# Patient Record
Sex: FEMALE | Race: WHITE | NOT HISPANIC OR LATINO | Employment: FULL TIME | URBAN - METROPOLITAN AREA
[De-identification: names, ages, dates, MRNs, and addresses within clinical notes are randomized per-mention and may not be internally consistent; named-entity substitution may affect disease eponyms.]

---

## 2020-10-07 ENCOUNTER — TRANSCRIBE ORDERS (OUTPATIENT)
Dept: SCHEDULING | Facility: REHABILITATION | Age: 60
End: 2020-10-07

## 2020-10-07 DIAGNOSIS — R42 DIZZINESS AND GIDDINESS: ICD-10-CM

## 2020-10-07 DIAGNOSIS — M54.2 CERVICALGIA: ICD-10-CM

## 2020-10-07 DIAGNOSIS — S06.0X0S CONCUSSION WITHOUT LOSS OF CONSCIOUSNESS, SEQUELA (CMS/HCC): Primary | ICD-10-CM

## 2020-10-07 DIAGNOSIS — R51.9 HEADACHE: ICD-10-CM

## 2020-10-14 ENCOUNTER — HOSPITAL ENCOUNTER (OUTPATIENT)
Dept: PHYSICAL THERAPY | Facility: REHABILITATION | Age: 60
Setting detail: THERAPIES SERIES
Discharge: HOME | End: 2020-10-14
Attending: PHYSICAL MEDICINE & REHABILITATION
Payer: MEDICARE

## 2020-10-14 DIAGNOSIS — M54.2 CERVICALGIA: ICD-10-CM

## 2020-10-14 DIAGNOSIS — R42 DIZZINESS AND GIDDINESS: ICD-10-CM

## 2020-10-14 DIAGNOSIS — R51.9 HEADACHE: ICD-10-CM

## 2020-10-14 DIAGNOSIS — S06.0X0S CONCUSSION WITHOUT LOSS OF CONSCIOUSNESS, SEQUELA (CMS/HCC): ICD-10-CM

## 2020-10-14 PROCEDURE — 97163 PT EVAL HIGH COMPLEX 45 MIN: CPT

## 2020-10-14 PROCEDURE — 97530 THERAPEUTIC ACTIVITIES: CPT | Mod: GP

## 2020-10-14 RX ORDER — AMITRIPTYLINE HYDROCHLORIDE 25 MG/1
25 TABLET, FILM COATED ORAL NIGHTLY
COMMUNITY

## 2020-10-14 RX ORDER — CYCLOBENZAPRINE HCL 5 MG
5 TABLET ORAL AS NEEDED
COMMUNITY

## 2020-10-14 RX ORDER — LEVOTHYROXINE SODIUM 75 UG/1
75 TABLET ORAL
COMMUNITY

## 2020-10-14 RX ORDER — ROSUVASTATIN CALCIUM 5 MG/1
2.5 TABLET, COATED ORAL NIGHTLY
COMMUNITY

## 2020-10-19 ENCOUNTER — HOSPITAL ENCOUNTER (OUTPATIENT)
Dept: PHYSICAL THERAPY | Facility: REHABILITATION | Age: 60
Setting detail: THERAPIES SERIES
Discharge: HOME | End: 2020-10-19
Attending: PHYSICAL MEDICINE & REHABILITATION
Payer: MEDICARE

## 2020-10-19 ENCOUNTER — DOCUMENTATION (OUTPATIENT)
Dept: SOCIAL WORK | Facility: REHABILITATION | Age: 60
End: 2020-10-19

## 2020-10-19 ENCOUNTER — HOSPITAL ENCOUNTER (OUTPATIENT)
Dept: OCCUPATIONAL THERAPY | Facility: REHABILITATION | Age: 60
Setting detail: THERAPIES SERIES
Discharge: HOME | End: 2020-10-19
Attending: PHYSICAL MEDICINE & REHABILITATION
Payer: MEDICARE

## 2020-10-19 DIAGNOSIS — M54.2 CERVICALGIA: ICD-10-CM

## 2020-10-19 DIAGNOSIS — S06.0X0S CONCUSSION WITHOUT LOSS OF CONSCIOUSNESS, SEQUELA (CMS/HCC): Primary | ICD-10-CM

## 2020-10-19 DIAGNOSIS — S06.0X0S CONCUSSION WITHOUT LOSS OF CONSCIOUSNESS, SEQUELA (CMS/HCC): ICD-10-CM

## 2020-10-19 DIAGNOSIS — G89.29 CHRONIC NONINTRACTABLE HEADACHE, UNSPECIFIED HEADACHE TYPE: ICD-10-CM

## 2020-10-19 DIAGNOSIS — R51.9 CHRONIC NONINTRACTABLE HEADACHE, UNSPECIFIED HEADACHE TYPE: ICD-10-CM

## 2020-10-19 DIAGNOSIS — R42 DIZZINESS AND GIDDINESS: ICD-10-CM

## 2020-10-19 PROCEDURE — 97112 NEUROMUSCULAR REEDUCATION: CPT | Mod: GP

## 2020-10-19 PROCEDURE — 97530 THERAPEUTIC ACTIVITIES: CPT | Mod: GP

## 2020-10-19 PROCEDURE — 97166 OT EVAL MOD COMPLEX 45 MIN: CPT | Mod: GO

## 2020-10-19 PROCEDURE — 97530 THERAPEUTIC ACTIVITIES: CPT | Mod: GO

## 2020-10-19 NOTE — PROGRESS NOTES
Referring Provider: By co-signing this Plan of Care (POC), you agree with the planned services and interventions recommended by the therapist.       NAME: _________________________________ DATE: _________________        [facility] Fax: [phone]      OT EVALUATION FOR OUTPATIENT THERAPY    Patient: Dulce Seo   MRN: [id number]  : 1960 60 y.o.  Referring Physician: Kevin Saleh DO  Date of Visit: 10/19/2020    Certification Dates:   10/19/20 through 20    Recommended Frequency & Duration:  Other(1 to 2 times per week) for up to 6 weeks     Diagnosis:   1. Concussion without loss of consciousness, sequela (CMS/HCC)          Chief Complaints:   Chief Complaint   Patient presents with   • Decreased Endurance   • Pain   •  Decreased Community Integration   •  Difficulty Performing Work/school Tasks   • Decreased recreational/play activity   • Poor Symptom Management       Precautions: no known precautions/restrictions    Past Medical History:   Past Medical History:   Diagnosis Date   • Ankle sprain    • Arthritis    • Dizziness    • Hyperthyroidism    • Low back strain    • Neck strain    • Shoulder injury        Past Surgical History:   Past Surgical History:   Procedure Laterality Date   • COSMETIC SURGERY     • JOINT REPLACEMENT           LEARNING ASSESSMENT    Assessment completed: Yes    Learner name:  Dulce Seo    Relationship: Patient    Learning Barriers:  Learning barriers: No Barriers    Preferred Language: English     Needed: No    Learning New Concepts: Listening, Reading, Demonstration and Pictures/Video      CO-LEARNER ASSESSMENT:    Completed: No            OBJECTIVE MEASUREMENTS/DATA:    Eval Assessment    Evaluation Assessment and Plan - 10/19/20 2618        Evaluation Assessment and Plan    Plan of Care reviewed and patient/family in agreement  Yes     System Pathology/Pathophysiology Noted  neuromuscular     Functional Limitations in Following  Categories  home management;work;community/leisure     Rehab Potential/Prognosis: Occupational Therapy  good, to achieve stated therapy goals     Clinical Assessment  61 yo female is presenting for an initial Occupational Therapy evaluation s/p concussion. Self-reported PPCS are 40/64 as measured by the [facility] [facility] Concussion Questionaire. She is reporting limited participation with IADLs/driving/recreation activities. She is not yet able to return to work. Reading and computer tolerance are poor. Dulce will benefit from further skilled OT to address functional deficits to help her reach prior level of function. She is challenged with ocular ROM deficits, convergence insufficiency, difficulty with tracking, intolerance to visual clutter, and limited visual endurance/concentration which may contribute to PPCS and impact participation in IADLs/community activities.     Planned Services  CPT 24290 Neuromuscular Reeducation;CPT 02368 Self-care/Home management training;CPT 88301 Therapeutic activities;CPT 26500 Therapeutic exercises         General Information    General Information - 10/19/20 1640        General Information    Document Type  initial evaluation     Onset of Illness/Injury or Date of Surgery  09/01/20     Referring Physician  Dr. Kevin Saleh     Pertinent History of Current Functional Problem  The patient sustained a concussion on 9/1/2020 as a result of a fall while at work. Due to persistent symptoms, she was referred to the Con[facility]ussion [facility] at [facility] where she saw Dr. Kevin Saleh. She is now presenting for Occupational Therapy.     Limitations/Impairments  visual     Existing Precautions/Restrictions  no known precautions/restrictions        Time Calculation    Start Time  1150     Stop Time  1249     Time Calculation (min)  59 min         Pain and Vitals   Pain/Vitals - 10/19/20 1158        Pain Assessment    Currently in pain  Yes     Preferred Pain Scale  number (Numeric Rating Pain  Scale)     Pain: Body location  Head;Eye     Pain Rating (0-10): Pre Activity  6     Pain Rating (0-10): Activity  7     Pain Rating (0-10): Post Activity  7        Pain Interventions    Intervention   Rest breaks as needed     Post Intervention Comments  None         Type and Frequency:   OT - 10/19/20 1641        Occupational Therapy Encounter Type Details    Occupational Therapy  Neuro        OT Frequency and Duration    Frequency of treatment  Other    1 to 2 times per week    OT Duration  6 weeks     OT Cert From  10/19/20     OT Cert To  12/08/20     Date OT POC was sent to provider  10/19/20     Signed OT Plan of Care received?   No         PLOF:   Prior Level of Function - 10/19/20 1203        OTHER    Previous level of function  Independent IADLs/Community Activities   +         Living Environment   Living Environment - 10/19/20 1202        Living Environment    Lives With  sibling(s)     Living Arrangements  house     Living Environment Comment  1 STH with 2 JESSICA     Transportation Concerns  car, none         Falls Assessment   Falls Assessment - 10/19/20 1200        Initial Falls Assessment    One or more falls in the last year  Yes     How many times  1     Was the patient injured in any fall  Yes     Fall prevention interventions recommended  Keep personal items within reach;Educate and re-educate the patient on safety strategies;Provide education to patient and family to maintain clear pathways and doorways throughout home;Put nightlight or bathroom light on during evening/night;Apply non-skid footwear at bedtime;Instruct the patient to change position slowly         Reading and Writing    Reading and Writing - 10/19/20 1206        Reading and Writing Assessment    Reading (comments)  Reading tolerance: has not been reading; Computer tolerance: approximately 5 minutes iphone         Work and School    Work and School Assessment - 10/19/20 1204        Work/School/Leisure Assessment    Job  Performance (comments)  Works full time as a  at [employer] - currently on medical leave     Leisure / Social Participation (comments)  Likes bird watching         Vision    Vision - 10/19/20 1209        Vision Assessment    Visual Impairment/Limitations  blurry vision;corrective lenses full time    Intermittent blurry vision with fatigue or when trying to focus    Visual Motor Impairment  accommodation;convergence, left;convergence, right;visual tracking, left;visual tracking, down;visual tracking, right;visual tracking, up    ocular ROM/gaze fixation; smooth pursuits       Oculomotor Control    Left eye assessed?  Yes     Right eye assessed?  Yes     Superior assessed?  Yes     Inferior assessed?  Yes     Diagonal assessed?  Yes     Circular assessed?  Yes     Left AROM without target  ROM Intact     Left oculomotor AROM Discomfort  Moderate     Left gaze fixation (10 second hold)  Able      Right AROM without target  ROM Intact      Right oculomotor AROM Discomfort  Moderate     Right gaze fixation (10 second hold)  Able     Superior AROM without target  ROM Intact      Superior oculomotor AROM Discomfort  Moderate     Superior gaze fixation (10 second hold)  Able     Inferior AROM without target  ROM Intact     Inferior oculomotor AROM Discomfort  Moderate     Inferior gaze fixation (10 second hold)  Able     Diagonal AROM without target  ROM Intact     Diagonal oculomotor AROM Discomfort  Mild     Circular AROM without target  ROM Intact     Circular oculomotor AROM Discomfort  Mild        Eye Teaming    Convergence  Impaired    Blurry at approximately 50 inches    Divergence  Impaired     Accommodation  Impaired     Smooth Pursuits  Impaired     3D Tracking  Impaired     Nystagmus  No        Saccadic Fixation Speed    Vertical Saccadic Fixation  --    NT due to escalating headache and time constraints    Horizontal Saccadic Fixation  --    NT due to escalating headache and time  constraints    Jesse Devick Test #1 (seconds)  --    NT due to escalating headache and time constraints       Visual Reaction Timing    Dynavision  NT due to escalating headache and time constraints        Symptoms and Outcome Measures    Symptoms  Headache;Dizziness;Nausea;Phonophobia;Sleep disturbance;Irritability;Fatigue;Aural fullness;Car sickness;Cognitive changes;Cognitive Fatigue;Difficulty reading;Difficulty using computer;Photophobia;Difficulty watching TV;Fogginess;Imbalance;Lightheaded;Multi-stimulus intolerance;Slowed processing;Tinnitus;Visual changes     [place] Score  40     Symptoms/Comments  See media section for details             GOALS:    Goals        General    • OT Goals      Short Term Goals  Time Frame Result Comment   Full ocular motor range of motion and control with mild PCS symptoms 4 weeks       Convergence less than or equal to 7 inches for near-focus tasks of reading and computer use with mild symptoms 4 weeks       Fair tolerance for normal volume and intensity of visual stimulation with mild symptoms after 20 minutes of engagement in a moderate to high multistim environment. 4 weeks       Visual reaction time within or less than .75 seconds via Dynavision with minimal symptoms  4 weeks       Saccadic fixation speed of less than 57 seconds as measured by the Jesse Devick Test with mild symptoms. 4 weeks       Patient will perform IADLs and community activities with decreased symptoms as evidenced by a 5 to 10 point reduction on the [facility] Concussion Questionaire. 4 weeks       Patient will be independent with visual home exercise program. 4 weeks       Long Term Goals  Time Frame Result Comment   Patient will complete necessary reading for work/leisure/school, with accommodations if indicated, with absent or manageable symptoms. By discharge       Patient will utilize computer for work/leisure/school tasks with accommodations if indicated with absent or manageable symptoms.  By discharge       Patient will perform IADLs and community activities with absent or manageable symptoms as measured by a score of less than 20 on the [facility] Concussion Questionaire. By discharge                      TREATMENT PLAN:      VISION/CONCUSSION OT FLOW SHEET    OT Vision/mTBI  EXERCISES CURRENT SESSION TIME   NEURO RE-ED TOTAL TIME FOR SESSION Not performed   Dynavision    VTS3/VTS4    CPT    BITs    NVR    Saccadic Fixation    Ocular Motor Control    Visual Tracing    3D Tracking    Visual Perception    Convergence/Divergence    Accommodation    Visual Stimulation    THER ACT TOTAL TIME FOR SESSION 8-22 Minutes   Pain, Vitals, Meds, Etc.    HEP Issued and reviewed ocular ROM exercises and modified pencil push ups.   Visual Endurance     Work/School Simulation     SELF CARE TOTAL TIME FOR SESSION Not performed   PCS Symptom Management/Education    ADL/IADLs                                                                                      This 60 y.o. year old female presents to OT with above stated diagnosis. Occupational Therapy evaluation reveals   resulting in home management, work, community/leisure limitations. Dulce Seo will benefit from skilled OT services to address limitation, work towards rehab and patient goals and maximize PLOF of chosen ADLs.     Planned Services: The patient’s treatment will include CPT 90121 Neuromuscular Reeducation, CPT 81999 Self-care/Home management training, CPT 42309 Therapeutic activities, CPT 83484 Therapeutic exercises, .

## 2020-10-19 NOTE — OP OT TREATMENT LOG
VISION/CONCUSSION OT FLOW SHEET    OT Vision/mTBI  EXERCISES CURRENT SESSION TIME   NEURO RE-ED TOTAL TIME FOR SESSION Not performed   Dynavision    VTS3/VTS4    CPT    BITs    NVR    Saccadic Fixation    Ocular Motor Control    Visual Tracing    3D Tracking    Visual Perception    Convergence/Divergence    Accommodation    Visual Stimulation    THER ACT TOTAL TIME FOR SESSION 8-22 Minutes   Pain, Vitals, Meds, Etc.    HEP Issued and reviewed ocular ROM exercises and modified pencil push ups.   Visual Endurance     Work/School Simulation     SELF CARE TOTAL TIME FOR SESSION Not performed   PCS Symptom Management/Education    ADL/IADLs

## 2020-10-19 NOTE — PATIENT INSTRUCTIONS
Patient Education   General Safety Tip Card        1.For safety, all exercises must be performed close to a support (wall, countertop, person, etc.) or in a corner with back of chair in front.  2.Only perform those exercises as instructed by the therapist. If instructions are not clearly understood, wait for clarification by therapist before attempting to perform.    Copyright © [employer]. All rights reserved.        Patient Education   Feet Together, Varied Arm Positions - Eyes Closed        Stand with feet together and arms down or crossws. Close eyes and visualize/sense upright position. Hold _30_ seconds.  Repeat __3-4__ times per session. Do _3-4___ sessions per day.    Copyright © I. All rights reserved.        Patient Education   Feet Together (Compliant Surface) Varied Arm Positions - Eyes Closed        Stand on compliant surface: _FOAM BALANCE PAD_______ with feet together and arms CROSSED OR DOWN. Close eyes and visualize/SENSE upright position. Hold_30___ seconds.  Repeat 3-4____ times per session. Do ___2_ sessions per day.    Copyright © [employer]. All rights reserved.

## 2020-10-19 NOTE — PROGRESS NOTES
CM received update from Katleynn Garcia, Patient Access Representative, that patient has surgery scheduled for 10/30/2020. CM met with patient to introduce self and provided contact information. CM reviewed need to discharge once surgical procedure takes place. Patient aware new prescription will be needed if outpatient services are still needed, once she has been cleared by surgeon.Юлия Saravia CM

## 2020-10-20 PROBLEM — R42 DIZZINESS AND GIDDINESS: Status: ACTIVE | Noted: 2020-10-20

## 2020-10-20 PROBLEM — R51.9 HEADACHE: Status: ACTIVE | Noted: 2020-10-20

## 2020-10-20 PROBLEM — M54.2 CERVICALGIA: Status: ACTIVE | Noted: 2020-10-20

## 2020-10-20 PROBLEM — S06.0X0A CONCUSSION WITH NO LOSS OF CONSCIOUSNESS: Status: ACTIVE | Noted: 2020-10-20

## 2020-10-20 NOTE — OP PT TREATMENT LOG
VESTIBULAR PT TREATMENT LOG                                                                                                                                                                                                                                                                                                                                 PRECAUTIONS:   Fall                                                                               DONE TODAY   y=yes  n=no  nv=next visit  PHYSICAL THERAPY / VESTIBULAR TREATMENT CATEGORY CURRENT SESSION Billable Minutes    Objective Measures Treatment details        Canalith Repositioning Maneuver/Treatment                                  (26524)                           Untimed    CRT Completed on:   See vestibular specialty flowsheet for details      Neuro Re-ed  (14270)                                 TOTAL TIME FOR SESSION:                    45          Minutes    VIDEO FRENZEL/POSITIONAL TESTING Completed on:See vestibular specialty flowsheet for details                                            CORRECTIVE SACCADES/ EYE EXERCISES     Corrective Saccades     VOR CANCELLATION       Standing H/V/C VOR-C      Standing H/V/C VOR-C compliant surface      Ambulation w/ H/V/C VOR-C            VOR / GAZE STABILITY       Standing H/VVOR      Standing H/VVOR, Busy Background     Standing H/VVOR noodles  Foreground  Backgroud  Peripheral      Ambulation w/H/VVOR       H/VVOR on compliant surfaces       H/VVOR on sway surfaces       Functional VOR       DVA                                           Norm: <2line decline Completed on:       GST    Completed on:             HABITUATION       Ball circles      Spiral Walk     Wall Rolls     Infinity Walk      Repetitive functional movements       MSQ                                            Norm=<2% Completed  on:           BALANCE- STATIC       On floor       Airex foam- EO/EC, head turns/nods      Rockerboard     yes STATIC BALANCE Measures    See below for MDCs Completed on: 10/19/2020. Issued home program    Rhomberg EO               60    Rhomberg EO FOAM    60 *    Rhomberg EC               60*    Rhomberg EC FOAM 60*    S.R. EO                        7  Strains back    S.R. EC                         3    SLS R EO 14 hurts back down left leg    SLS L  EO 30    SLS R EC 3    SLS L EC 5    yes SOT                                              MDC=8 pts,   <38 fall risk Completed on:  10/19/2020 47%, 33% below norm with mod decrease in VIS, Severe decrease in VEST. 2 falls on 5 2 falls on 6 below norm fro 3 and 4.      Bi[employer]x                                         HSSOT                                      Completed on:           BALANCE- DYNAMIC      Tandem Walking      Ambulation -head turns/nods       Ambulation - 180 & 360 degree turns      Ambulation EC      Retro ambulation EO/EC       Obstacles      FGA                                            MDC=6pts  Norms: <40=29, 50-59=28, 60-69=27, 70-80=25, 80-90=21   </=23=fall risk  Gait speed MDC =.82 ft/sec Completed on:           High Level Dynamic Functional      Multitasking/Cognitive       Optokinetic Stimulation      CERVICAL KINESTHESIA     JPE           DONE TODAY(y/n) Manual Therapy  (96205)                               TOTAL TIME FOR SESSION:                                    Minutes           DONE TODAY(y/n)  Therapeutic Exercise  (16734)                                 TOTAL TIME FOR SESSION:                                                  Minutes                                                    Lower Extremity Stretching     Cervical Exercises      CARDIOVASCULAR        Nustep            Seat:            Arms: Min      Level     Avg Alonso      Spm      Pieter       Steps    RPE       Recumbent Bike   Min     Miles    Pieter    Virtual Reality Bike   Min   "   Miles    Pieter   HR Avg/Max    Alonso Avg/Max     MPH Avg Max    Treadmill  Min     Mph     % grade        Initial  HR       Max HR           BCTT          DONE TODAY(y/n)  Therapeutic Activity  (24424)                                TOTAL TIME FOR SESSION:                    15             Minutes   yes PAIN/VITALS  See Pain/Vitals section for details   yes OUTCOME MEASURE                 ABC MDC=13pts  DHI MDC=18pts  <67-80=risk for fall Completed on: handed in a second completed [id number]/14/2020:41% on ABC and 84 on DHI  ABC 55% with inconsistencies noticed in answers, DHI 65% with 5 questions not answered.    yes Reviewed chief complaint, medication changes, falls, plan of care, schedule, pain and symptoms     yes PATIENT EDUCATION LOG 10/14/2020:  Pt educated that  treatment will include Vestibular and Balance Rehabilitation including habituation, VOR adaptation, balance training, gait training, symptom management training including symptom monitoring and activity modification to allow incremental exposure to symptom provoking activities, while avoiding overstimulation and promoting adequate rest, healthy diet and participation in light cardiovascular activity.  Pt may also receive cervical evaluation, joint and soft tissue mobilization, manual therapy, neuromuscular re-education, physical reconditioning, exertional training and therapeutic activities to promote return to prior level of function.  Issued patient \" More than a bump on the head\" pamphlet from the brain injury association. Issued Blue brain injury folder with lifestyle recommendations.                    "

## 2020-10-20 NOTE — PROGRESS NOTES
Chantal Leblanc is a 81 year old female presenting for routine follow up and medication review.     Denies known Latex allergy or symptoms of Latex sensitivity.    Medications verified, no changes.    Health Maintenance Due   Topic Date Due   • DTaP/Tdap/Td Vaccine (1 - Tdap) 05/14/1957   • Medicare Wellness 65+  06/18/2019   • Depression Screening  06/18/2019   • Influenza Vaccine (1) 09/01/2019   • DM/CKD Microalbumin  12/10/2019   • DM/CKD GFR  12/10/2019       Patient is due for topics listed above, she wishes to proceed with Immunization(s) Dtap/Tdap/Td and Influenza.        PT DAILY NOTE FOR OUTPATIENT THERAPY    Patient: Dulce Seo   MRN: [id number]  : 1960 60 y.o.  Referring Physician: Kevin Saleh DO  Date of Visit: 10/19/2020    Certification Dates: 10/14/20 through 21    Diagnosis:   1. Concussion without loss of consciousness, sequela (CMS/HCC)    2. Dizziness and giddiness    3. Chronic nonintractable headache, unspecified headache type    4. Cervicalgia        Chief Complaints:  Headache, tightness in the neck and up to the eyes. Didn't sleep well, Fogginess. Balance not too bad , haven't noticed it yet.    Precautions:         TODAY'S VISIT    History/Vitals/Pain/Encounter Info - 10/19/20 1011        Injury History/Precautions/Daily Required Info    Primary Therapist  Will Rocael PT     Chief Complaint/Reason for Visit   Headache, tightness in the neck and up to the eyes. Didn't sleep well, Fogginess. Balance not too bad , haven't noticed it yet.     Onset of Illness/Injury or Date of Surgery  20     Referring Physician  Dr. Saleh     Pertinent History of Current Functional Problem  Pt reports there was a hump on the floor in a restaurant and tripped on the hump and fell on the floor.  On my way down I remember a pain in my back and then I blacked out.  I couldn't bear weight and 3 people helped me up. Worked at [employer].  I tried to get up again and was going dwon again.  Tried to work again and was getting sicker and sicker.  Couldn't remember the fall. Took of and got tested for Corona Virus .  Tried to work again and then went to Urgent Care at [facility].Went to the hospital and had brain scans and shoulder and back at [place].  Went to a chiropractor to get adjustive . CT(-)     OP Specialty  Concussion;Vestibular     Document Type  initial evaluation     Patient/Family/Caregiver Comments/Observations  Headache, tightness in the neck and up to the eyes. Didn't sleep well, Fogginess. Balance not too bad , haven't  noticed it yet.     Start Time  1000     Stop Time  1100     Time Calculation (min)  60 min     Patient reported fall since last visit  No        Pain Assessment    Currently in pain  Yes     Preferred Pain Scale  number (Numeric Rating Pain Scale)     Pain: Body location  Back;Neck     Pain Rating (0-10): Pre Activity  6        Pain Intervention    Intervention   na     Post Intervention Comments  na         Daily Treatment Assessment and Plan - 10/19/20 1101        Daily Treatment Assessment and Plan    Progress toward goals  Progressing     Daily Outcome Summary  Pt continues to show inconsistencies in her DHI and ABV. SOT shows vestibular and visual processing deficits. Static balance testing shows vestibular and orthopedic issues limiting balance     Plan and Recommendations  Continue with Vestibular and Balance Rehab. Need to verbally review ABC and DHI with patient for clarification.           OBJECTIVE DATA TAKEN TODAY:    Vestibular        Today's Treatment:                                                                                                                                 VESTIBULAR PT TREATMENT LOG                                                                                                                                                                                                                                                                                                                                 PRECAUTIONS:   Fall                                                                               DONE TODAY   y=yes  n=no  nv=next visit  PHYSICAL THERAPY / VESTIBULAR TREATMENT CATEGORY CURRENT SESSION Billable Minutes    Objective Measures Treatment details        Canalith Repositioning Maneuver/Treatment                                  (32578)                           Untimed    CRT Completed on:   See vestibular specialty flowsheet for details      Neuro Re-ed  ([id number])                                  TOTAL TIME FOR SESSION:                    45          Minutes    VIDEO FRENZEL/POSITIONAL TESTING Completed on:See vestibular specialty flowsheet for details                                            CORRECTIVE SACCADES/ EYE EXERCISES     Corrective Saccades     VOR CANCELLATION       Standing H/V/C VOR-C      Standing H/V/C VOR-C compliant surface      Ambulation w/ H/V/C VOR-C            VOR / GAZE STABILITY       Standing H/VVOR      Standing H/VVOR, Busy Background     Standing H/VVOR noodles  Foreground  Backgroud  Peripheral      Ambulation w/H/VVOR       H/VVOR on compliant surfaces       H/VVOR on sway surfaces       Functional VOR       DVA                                           Norm: <2line decline Completed on:       GST    Completed on:             HABITUATION       Ball circles      Spiral Walk     Wall Rolls     Infinity Walk      Repetitive functional movements       MSQ                                            Norm=<2% Completed on:           BALANCE- STATIC       On floor       Airex foam- EO/EC, head turns/nods      Rockerboard     yes STATIC BALANCE Measures    See below for MDCs Completed on: 10/19/2020. Issued home program    Rhomberg EO               60    Rhomberg EO FOAM    60 *    Rhomberg EC               60*    Rhomberg EC FOAM 60*    S.R. EO                        7  Strains back    S.R. EC                         3    SLS R EO 14 hurts back down left leg    SLS L  EO 30    SLS R EC 3    SLS L EC 5    yes SOT                                              MDC=8 pts,   <38 fall risk Completed on:  10/19/2020 47%, 33% below norm with mod decrease in VIS, Severe decrease in VEST. 2 falls on 5 2 falls on 6 below norm fro 3 and 4.      Biodex                                         HSSOT                                      Completed on:           BALANCE- DYNAMIC      Tandem Walking      Ambulation -head turns/nods       Ambulation - 180 & 360 degree turns       Ambulation EC      Retro ambulation EO/EC       Obstacles      FGA                                            MDC=6pts  Norms: <40=29, 50-59=28, 60-69=27, 70-80=25, 80-90=21   </=23=fall risk  Gait speed MDC =.82 ft/sec Completed on:           High Level Dynamic Functional      Multitasking/Cognitive       Optokinetic Stimulation      CERVICAL KINESTHESIA     JPE           DONE TODAY(y/n) Manual Therapy  ([id number])                               TOTAL TIME FOR SESSION:                                    Minutes           DONE TODAY(y/n)  Therapeutic Exercise  ([id number])                                 TOTAL TIME FOR SESSION:                                                  Minutes                                                    Lower Extremity Stretching     Cervical Exercises      CARDIOVASCULAR        Nustep            Seat:            Arms: Min      Level     Avg Alonso      Spm      Pieter       Steps    RPE       Recumbent Bike   Min     Miles    Pieter    Virtual Reality Bike   Min     Miles    Pieter   HR Avg/Max    Alonso Avg/Max     MPH Avg Max    Treadmill  Min     Mph     % grade        Initial  HR       Max HR           BCTT          DONE TODAY(y/n)  Therapeutic Activity  ([id number])                                TOTAL TIME FOR SESSION:                    15             Minutes   yes PAIN/VITALS  See Pain/Vitals section for details   yes OUTCOME MEASURE                 ABC MDC=13pts  DHI MDC=18pts  <67-80=risk for fall Completed on: handed in a second completed [id number]/14/2020:41% on ABC and 84 on DHI  ABC 55% with inconsistencies noticed in answers, DHI 65% with 5 questions not answered.    yes Reviewed chief complaint, medication changes, falls, plan of care, schedule, pain and symptoms     yes PATIENT EDUCATION LOG 10/14/2020:  Pt educated that  treatment will include Vestibular and Balance Rehabilitation including habituation, VOR adaptation, balance training, gait training, symptom management training including  "symptom monitoring and activity modification to allow incremental exposure to symptom provoking activities, while avoiding overstimulation and promoting adequate rest, healthy diet and participation in light cardiovascular activity.  Pt may also receive cervical evaluation, joint and soft tissue mobilization, manual therapy, neuromuscular re-education, physical reconditioning, exertional training and therapeutic activities to promote return to prior level of function.  Issued patient \" More than a bump on the head\" pamphlet from the brain injury association. Issued Blue brain injury folder with lifestyle recommendations.                                     "

## 2020-10-20 NOTE — OP PT TREATMENT LOG
VESTIBULAR PT TREATMENT LOG                                                                                                                                                                                                                                                                                                                                 PRECAUTIONS:   Fall                                                                               DONE TODAY   y=yes  n=no  nv=next visit  PHYSICAL THERAPY / VESTIBULAR TREATMENT CATEGORY CURRENT SESSION Billable Minutes    Objective Measures Treatment details        Canalith Repositioning Maneuver/Treatment                                  ([id number])                           Untimed    CRT Completed on:   See vestibular specialty flowsheet for details      Neuro Re-ed  ([id number])                                 TOTAL TIME FOR SESSION:                              Minutes    VIDEO FRENZEL/POSITIONAL TESTING Completed on:See vestibular specialty flowsheet for details                                            CORRECTIVE SACCADES/ EYE EXERCISES     Corrective Saccades     VOR CANCELLATION       Standing H/V/C VOR-C      Standing H/V/C VOR-C compliant surface      Ambulation w/ H/V/C VOR-C            VOR / GAZE STABILITY       Standing H/VVOR      Standing H/VVOR, Busy Background     Standing H/VVOR noodles  Foreground  Backgroud  Peripheral      Ambulation w/H/VVOR       H/VVOR on compliant surfaces       H/VVOR on sway surfaces       Functional VOR       DVA                                           Norm: <2line decline Completed on:       GST    Completed on:             HABITUATION       Ball circles      Spiral Walk     Wall Rolls     Infinity Walk      Repetitive functional movements       MSQ                                            Norm=<2% Completed  on:           BALANCE- STATIC       On floor       Airex foam- EO/EC, head turns/nods      Rockerboard      STATIC BALANCE Measures    See below for MDCs Completed on:     Rhomberg EO                   Rhomberg EO FOAM        Rhomberg EC                   Rhomberg EC FOAM     S.R. EO                            S.R. EC                             SLS R EO     SLS L  EO     SLS R EC     SLS L EC      SOT                                              MDC=8 pts,   <38 fall risk Completed on:      Biodex                                         HSSOT                                      Completed on:           BALANCE- DYNAMIC      Tandem Walking      Ambulation -head turns/nods       Ambulation - 180 & 360 degree turns      Ambulation EC      Retro ambulation EO/EC       Obstacles      FGA                                            MDC=6pts  Norms: <40=29, 50-59=28, 60-69=27, 70-80=25, 80-90=21   </=23=fall risk  Gait speed MDC =.82 ft/sec Completed on:           High Level Dynamic Functional      Multitasking/Cognitive       Optokinetic Stimulation      CERVICAL KINESTHESIA     JPE           DONE TODAY(y/n) Manual Therapy  ([id number])                               TOTAL TIME FOR SESSION:                                    Minutes           DONE TODAY(y/n)  Therapeutic Exercise  ([id number])                                 TOTAL TIME FOR SESSION:                                                  Minutes                                                    Lower Extremity Stretching     Cervical Exercises      CARDIOVASCULAR        Nustep            Seat:            Arms: Min      Level     Avg Alonso      Spm      Pieter       Steps    RPE       Recumbent Bike   Min     Miles    Pieter    Virtual Reality Bike   Min     Miles    Pieter   HR Avg/Max    Alonso Avg/Max     MPH Avg Max    Treadmill  Min     Mph     % grade        Initial  HR       Max HR           BCTT          DONE TODAY(y/n)  Therapeutic Activity  ([id number])                            "     TOTAL TIME FOR SESSION:                      30              Minutes   yes PAIN/VITALS  See Pain/Vitals section for details   yes OUTCOME MEASURE                 ABC MDC=13pts  DHI MDC=18pts  <67-80=risk for fall Completed on: 10/14/2020:  ABC 55% with inconsistencies noticed in answers, DHI 65% with 5 questions not answered.    yes Reviewed chief complaint, medication changes, falls, plan of care, schedule, pain and symptoms     yes PATIENT EDUCATION LOG 10/14/2020:  Pt educated that  treatment will include Vestibular and Balance Rehabilitation including habituation, VOR adaptation, balance training, gait training, symptom management training including symptom monitoring and activity modification to allow incremental exposure to symptom provoking activities, while avoiding overstimulation and promoting adequate rest, healthy diet and participation in light cardiovascular activity.  Pt may also receive cervical evaluation, joint and soft tissue mobilization, manual therapy, neuromuscular re-education, physical reconditioning, exertional training and therapeutic activities to promote return to prior level of function.  Issued patient \" More than a bump on the head\" pamphlet from the brain injury association. Issued Blue brain injury folder with lifestyle recommendations.                    "

## 2020-10-20 NOTE — PROGRESS NOTES
Referring Provider: By co-signing this Plan of Care (POC), you agree with the planned services and interventions recommended by the therapist.       NAME: __________________________________ DATE: ___________________        [facility] Fax: [phone]        PT EVALUATION FOR OUTPATIENT THERAPY    Patient: Dulce Seo    MRN: [id number]  : 1960 60 y.o.   Referring Physician: Kevin Saleh DO  Date of Visit: 10/14/2020      Certification Dates:   10/14/20 through 21    Recommended Frequency & Duration:  2 times/week for up to 3 months     Diagnosis:   1. Concussion without loss of consciousness, sequela (CMS/HCC)    2. Dizziness and giddiness    3. Headache    4. Cervicalgia        Chief Complaints:   Chief Complaint   Patient presents with   • Dizziness   •  Imbalance   • Headache   • Pain   • Fatigue   • Balance Deficits   • Cognition   •  Difficulty Performing Work/school Tasks   • Memory Loss       Precautions:      Past Medical History:   Past Medical History:   Diagnosis Date   • Ankle sprain    • Arthritis    • Dizziness    • Hyperthyroidism    • Low back strain    • Neck strain    • Shoulder injury        Past Surgical History:   Past Surgical History:   Procedure Laterality Date   • COSMETIC SURGERY     • JOINT REPLACEMENT           LEARNING ASSESSMENT    Assessment completed: Yes    Learner name:  Dulce    Relationship: Patient    Learning Barriers:  Learning barriers: No Barriers    Preferred Language: English     Needed: No    Learning New Concepts: Listening, Reading, Demonstration and Pictures/Video      CO-LEARNER ASSESSMENT:    Completed: No            OBJECTIVE MEASUREMENTS/DATA:    Eval Assessment    Evaluation Assessment and Plan - 10/14/20 1400        Evaluation Assessment and Plan    Plan of Care reviewed and patient/family in agreement  Yes     System Pathology/Pathophysiology Noted  musculoskeletal;neuromuscular;vestibular     Functional  Limitations in Following Categories (PT Eval)  self-care;home management;work;community/leisure     Rehab Potential/Prognosis  good, to achieve stated therapy goals     Problem List  abnormal muscle tone;decreased endurance;decreased flexibility;decreased ROM;decreased strength;impaired balance;gaze stabilization;dizziness;impaired motor control;impaired postural control;impaired sensation;pain;motion sensitivity;visual motion intolerance;impaired sensory feedback     Clinical Assessment  See below     Planned Services  CPT 85039 Manual therapy;CPT 02248 Therapeutic activities;CPT 84429 Electrical stimulation UNATTENDED;CPT 36634 Canalith repositioning procedure/maneuvers;CPT 74605 Neuromuscular Reeducation;CPT 32841 Therapeutic exercises;CPT 92171 Work conditioning;CPT 51756 Hot/Cold Packs therapy;CPT 51604 Gait training;CPT 83804 Self-care/Home management training;CPT 43156 Therapeutic Massage;CPT 58746 Electrical stimulation ATTENDED     Comments/Additional Services  Vestibular, Balance, Cervical eval and treat as appropriate.         General Info   General Information - 10/14/20 2172        Session Details    Document Type  initial evaluation     Mode of Treatment  physical therapy     Patient/Family/Caregiver Comments/Observations  Pt reports that lower lumbar is bad adn upper back is bad and have had nausea for weeks, dizziness and foggy brain. Chriopractor helped with the back and the neck and having migraines.  Noticing imbalance .  Having ankle replacements.     OP Specialty  Concussion;Vestibular        Time Calculation    Start Time  1300     Stop Time  1400     Time Calculation (min)  60 min        General Information    Onset of Illness/Injury or Date of Surgery  09/01/20     Referring Physician  Dr. Saleh     Pertinent History of Current Functional Problem  Pt reports there was a hump on the floor in a restaurant and tripped on the hump and fell on the floor.  On my way down I remember a pain in my  back and then I blacked out.  I couldn't bear weight and 3 people helped me up. Worked at [employer].  I tried to get up again and was going dwon again.  Tried to work again and was getting sicker and sicker.  Couldn't remember the fall. Took of and got tested for Corona Virus .  Tried to work again and then went to Urgent Care at [facility].Went to the hospital and had brain scans and shoulder and back at [place].  Went to a chiropractor to get adjustive . CT(-)         Pain and Vitals   Pain/Vitals - 10/14/20 1333        Pain Assessment    Currently in pain  Yes     Preferred Pain Scale  number (Numeric Rating Pain Scale)     Pain: Body location  Neck;Back     Pain Rating (0-10): Pre Activity  6        Pain Intervention    Intervention   na     Post Intervention Comments  na         Falls Assessment    Falls - 10/14/20 1344        Initial Falls Assessment    One or more falls in the last year  No         Living Environment    Living Environment - 10/14/20 1342        Living Environment    Lives With  sibling(s)     Living Arrangements  house     Living Environment Comment  2 steps to enter and 8 steps to the basement.     Transportation Concerns  car, none         PLOF:   Prior Level of Function - 10/14/20 1342        OTHER    Previous level of function  No limitations         Type and Frequency:   PT - 10/14/20 1326        Physical Therapy    Physical Therapy  Vestibular        PT Plan    Frequency of treatment  2 times/week     PT Duration  3 months     PT Cert From  10/14/20     PT Cert To  01/12/21     Signed PT Plan of Care received?   No         Vestibular    PT Vestibular Evaluation - 10/14/20 1300        Imaging    Imaging studies  CT Scan        Vestibular Symptoms    Symptoms  Dizziness;Lightheadedness;Nausea;Imbalance;Headache;Sleep Disorders;Photophobia;Anxiety;Emotional changes;Mood swings;Aural fullness;Multi-stimulus intolerance;Cognitive changes;Cognitive  fatigue;Fogginess;Forgetfulness;Slowed processing;Visual changes;Difficulty reading;Difficulty using computer;Difficulty watching TV        Vestibular/Ocular    Corrective lenses  Progressives     Convergence Vision  ABN     Comments  dizziness but normal distance.      Divergent Vision  ABN     Comments  dizziness but normal distance     Pupil Alignment  WNL     Cover/Uncover  WNL     Cover/Cross Over  WNL     Smooth Pursuit/Small Target  Abnormal     Comments  Dizziness     Saccades  Abnormal     Comments  worse upward     Vestibular Ocular Reflex (VOR)  Abnormal     Comments  symptomatic     Spontaneous Evoked Nystagmus  WNL     Gaze-Evoked Nystagmus  WNL     VOR Cancellation  Abnormal     Comments  dizziness             Goals        Patient Stated    • To function correctly. Not feel off.  Want to feel whole again. (pt-stated)        Other    • Vestibular/Concussion STG/LTGs      Goals Short-Long Time Frame       Result Comment   SHORT TERM GOALS       1. Romberg, Sharpened Romberg and Single Limb Stance TBA with eyes opened and closed Short Term  4-6   weeks     2. Patient will decrease motion sensitivity quotient TBA for increased functional tolerance. Short Term 4-6 weeks     3. Patient will increase Balance Master SOT score to TBA   %, HSSOT score TBA for increased postural control. Short Term 4-6 weeks     4. Patient will increase FGA score to TBA   for increased gait stability. Short Term 4-6 weeks     5. Patient will improve DVA to </= TBA line difference for functional level gaze stability. Short Term 4-6 weeks     6. Patient will achieve    TBA on GST (Gaze Stability Test) for functional level gaze stability. Short Term 4-6 weeks     7. Patient will perform home exercise program with supervision. Short Term 4-6 weeks     8. Patient will tolerate 10 minutes of cardiovascular conditioning exercise.  Short Term 4-6 weeks     9. Patient will have negative BPPV all canals for symptom-free functional  mobility. Short Term 4-6 weeks     LONG TERM GOALS       1. Static Romberg, Sharpened Romberg and SLS WNL for age with eyes opened and eyes closed.  Long Term 8-12 weeks     1. Patient will decrease motion sensitivity quotient to <2 for increased functional tolerance. Long Term 8-12 weeks     2. Patient will increase Balance Master SOT score to WNL, HSSOT score to WNL to maximize safety and high level functional mobility. Long Term 8-12 weeks     3. Patient will increase FGA score to >/= 24/30     for maximal gait stability, safety and functional mobility. Long Term 8-12 weeks     4. Patient will improve DVA to </=   2    line difference for functional level gaze stability. Long Term 8-12 weeks     5. Patient will achieve   WNL   on GST (Gaze Stability Test) for functional/high level gaze stability. Long Term 8-12 weeks     6. Pt will be independent with HEP Long Term 8-12 weeks     7. Patient will tolerate 15 minutes of cardiovascular conditioning exercise. Long Term 8-12 weeks     8. Patient will have negative BPPV all canals for symptom-free functional mobility. Long Term 8-12 weeks     9. Patient will return to household, community, and recreational activities of daily living.  Long Term 8-12 weeks     10. Patient will improve outcome measure                                            dhi         to           <10%          reflecting decreased symptoms and improved participation in functional activity. Long Term 8-12 weeks     11. Patient will have no increased symptoms with challenging VOR activities for symptom free high level activities. Long Term 8-12 weeks                        TREATMENT PLAN:                                                                                                                                 VESTIBULAR PT TREATMENT LOG                                                                                                                                                                                                                                                                                                                                  PRECAUTIONS:   Fall                                                                               DONE TODAY   y=yes  n=no  nv=next visit  PHYSICAL THERAPY / VESTIBULAR TREATMENT CATEGORY CURRENT SESSION Billable Minutes    Objective Measures Treatment details        Canalith Repositioning Maneuver/Treatment                                  (12335)                           Untimed    CRT Completed on:   See vestibular specialty flowsheet for details      Neuro Re-ed  (53641)                                 TOTAL TIME FOR SESSION:                              Minutes    VIDEO FRENZEL/POSITIONAL TESTING Completed on:See vestibular specialty flowsheet for details                                            CORRECTIVE SACCADES/ EYE EXERCISES     Corrective Saccades     VOR CANCELLATION       Standing H/V/C VOR-C      Standing H/V/C VOR-C compliant surface      Ambulation w/ H/V/C VOR-C            VOR / GAZE STABILITY       Standing H/VVOR      Standing H/VVOR, Busy Background     Standing H/VVOR noodles  Foreground  Backgroud  Peripheral      Ambulation w/H/VVOR       H/VVOR on compliant surfaces       H/VVOR on sway surfaces       Functional VOR       DVA                                           Norm: <2line decline Completed on:       GST    Completed on:             HABITUATION       Ball circles      Spiral Walk     Wall Rolls     Infinity Walk      Repetitive functional movements       MSQ                                            Norm=<2% Completed on:           BALANCE- STATIC       On floor       Airex foam- EO/EC, head turns/nods      Rockerboard      STATIC BALANCE Measures    See below for MDCs Completed on:     Rhomberg EO                   Rhomberg EO FOAM        Rhomberg EC                   Rhomberg EC FOAM     S.R. EO                             S.R. EC                             SLS R EO     SLS L  EO     SLS R EC     SLS L EC      SOT                                              MDC=8 pts,   <38 fall risk Completed on:      Biodex                                         HSSOT                                      Completed on:           BALANCE- DYNAMIC      Tandem Walking      Ambulation -head turns/nods       Ambulation - 180 & 360 degree turns      Ambulation EC      Retro ambulation EO/EC       Obstacles      FGA                                            MDC=6pts  Norms: <40=29, 50-59=28, 60-69=27, 70-80=25, 80-90=21   </=23=fall risk  Gait speed MDC =.82 ft/sec Completed on:           High Level Dynamic Functional      Multitasking/Cognitive       Optokinetic Stimulation      CERVICAL KINESTHESIA     JPE           DONE TODAY(y/n) Manual Therapy  (99505)                               TOTAL TIME FOR SESSION:                                    Minutes           DONE TODAY(y/n)  Therapeutic Exercise  (98515)                                 TOTAL TIME FOR SESSION:                                                  Minutes                                                    Lower Extremity Stretching     Cervical Exercises      CARDIOVASCULAR        Nustep            Seat:            Arms: Min      Level     Avg Alonso      Spm      Pieter       Steps    RPE       Recumbent Bike   Min     Miles    Pieter    Virtual Reality Bike   Min     Miles    Pieter   HR Avg/Max    Alonso Avg/Max     MPH Avg Max    Treadmill  Min     Mph     % grade        Initial  HR       Max HR           BCTT          DONE TODAY(y/n)  Therapeutic Activity  (36030)                                TOTAL TIME FOR SESSION:                      30              Minutes   yes PAIN/VITALS  See Pain/Vitals section for details   yes OUTCOME MEASURE                 ABC MDC=13pts  DHI MDC=18pts  <67-80=risk for fall Completed on: 10/14/2020:  ABC 55% with inconsistencies noticed in answers, DHI 65% with 5  "questions not answered.    yes Reviewed chief complaint, medication changes, falls, plan of care, schedule, pain and symptoms     yes PATIENT EDUCATION LOG 10/14/2020:  Pt educated that  treatment will include Vestibular and Balance Rehabilitation including habituation, VOR adaptation, balance training, gait training, symptom management training including symptom monitoring and activity modification to allow incremental exposure to symptom provoking activities, while avoiding overstimulation and promoting adequate rest, healthy diet and participation in light cardiovascular activity.  Pt may also receive cervical evaluation, joint and soft tissue mobilization, manual therapy, neuromuscular re-education, physical reconditioning, exertional training and therapeutic activities to promote return to prior level of function.  Issued patient \" More than a bump on the head\" pamphlet from the brain injury association. Issued Blue brain injury folder with lifestyle recommendations.                            ASSESSMENT:    This 60 y.o. year old female presents to PT with above stated diagnosis, PPCS includingDizziness;Lightheadedness;Nausea;Imbalance;Headache;Sleep Disorders;Photophobia;Anxiety;Emotional changes;Mood swings;Aural fullness;Multi-stimulus intolerance;Cognitive changes;Cognitive fatigue;Fogginess;Forgetfulness;Slowed processing;Visual changes;Difficulty reading;Difficulty using computer;Difficulty watching TV. Physical Therapy evaluation reveals abnormal muscle tone, decreased endurance, decreased flexibility, decreased ROM, decreased strength, impaired balance, gaze stabilization, dizziness, impaired motor control, impaired postural control, impaired sensation, pain, motion sensitivity, visual motion intolerance, impaired sensory feedback resulting in self-care, home management, work, community/leisure limitations. Dulce Seo will benefit from skilled PT services to address limitation, work towards rehab " and patient goals and maximize PLOF of chosen ADLs.     Planned Services: The patient's treatment will include CPT 68764 Manual therapy, CPT 07543 Therapeutic activities, CPT 45825 Electrical stimulation UNATTENDED, CPT 98201 Canalith repositioning procedure/maneuvers, CPT 97984 Neuromuscular Reeducation, CPT 86622 Therapeutic exercises, CPT 70636 Work conditioning, CPT 52206 Hot/Cold Packs therapy, CPT 44725 Gait training, CPT 36134 Self-care/Home management training, CPT 36233 Therapeutic Massage, CPT 24820 Electrical stimulation ATTENDED,Vestibular, Balance, Cervical eval and treat as appropriate..

## 2020-10-23 ENCOUNTER — HOSPITAL ENCOUNTER (OUTPATIENT)
Dept: PSYCHOLOGY | Facility: CLINIC | Age: 60
Discharge: HOME | End: 2020-10-23
Payer: MEDICARE

## 2020-10-23 DIAGNOSIS — S06.0X1S CONCUSSION WITH LOSS OF CONSCIOUSNESS <= 30 MIN, SEQUELA (CMS/HCC): ICD-10-CM

## 2020-10-23 PROCEDURE — 96136 PSYCL/NRPSYC TST PHY/QHP 1ST: CPT | Performed by: PSYCHOLOGIST

## 2020-10-23 PROCEDURE — 96132 NRPSYC TST EVAL PHYS/QHP 1ST: CPT | Performed by: PSYCHOLOGIST

## 2020-10-23 PROCEDURE — 96137 PSYCL/NRPSYC TST PHY/QHP EA: CPT | Performed by: PSYCHOLOGIST

## 2020-10-23 PROCEDURE — 96133 NRPSYC TST EVAL PHYS/QHP EA: CPT | Performed by: PSYCHOLOGIST

## 2020-10-23 SDOH — HEALTH STABILITY: MENTAL HEALTH: HOW OFTEN DO YOU HAVE A DRINK CONTAINING ALCOHOL?: NEVER

## 2020-10-23 SDOH — ECONOMIC STABILITY: TRANSPORTATION INSECURITY: IN THE PAST 12 MONTHS, HAS LACK OF TRANSPORTATION KEPT YOU FROM MEDICAL APPOINTMENTS OR FROM GETTING MEDICATIONS?: NO

## 2020-10-23 SDOH — ECONOMIC STABILITY: FOOD INSECURITY: WITHIN THE PAST 12 MONTHS, THE FOOD YOU BOUGHT JUST DIDN'T LAST AND YOU DIDN'T HAVE MONEY TO GET MORE.: NEVER TRUE

## 2020-10-23 SDOH — ECONOMIC STABILITY: FOOD INSECURITY: HOW HARD IS IT FOR YOU TO PAY FOR THE VERY BASICS LIKE FOOD, HOUSING, MEDICAL CARE, AND HEATING?: NOT HARD AT ALL

## 2020-10-23 SDOH — HEALTH STABILITY: MENTAL HEALTH: HOW OFTEN DO YOU HAVE SIX OR MORE DRINKS ON ONE OCCASION?: NEVER

## 2020-10-23 SDOH — ECONOMIC STABILITY: FOOD INSECURITY: WITHIN THE PAST 12 MONTHS, YOU WORRIED THAT YOUR FOOD WOULD RUN OUT BEFORE YOU GOT THE MONEY TO BUY MORE.: NEVER TRUE

## 2020-10-23 ASSESSMENT — ACTIVITIES OF DAILY LIVING (ADL): LACK_OF_TRANSPORTATION: NO

## 2020-10-23 NOTE — PROGRESS NOTES
"[facility] Neuropsychology Screening Evaluation   Outpatient  Date of Onset:  2020       Diagnosis:      Reason for Referral:  Dulce Seo, , : 1960, is a right handed female referred by Kevin Saleh D.O.,  for Neuropsychological screening.  Dulce reported that she was working as a  when she tripped and fell at work on 2020. She reported that she fell backwards and hit her back and her head. She reported today that she recalls feeling pain \"like a knife\" but experienced loss of consciousness for a minute. She recalls trying to get up and could not, until several people helped her up and into a chair. She noted experiencing fogginess, an inability to think, and having tight muscles. She said that she went to work for a couple of days but was under a lot of pain and the brain fog, leading her to go to the ER for worry about the Coronavirus. She went to [facility] on 2020 where a non-contrast head CT was negative for skull fracture and intracranial hemorrhage and a CT of the cervical spine was negative for fracture or dislocation.She received treatment with her chiropractor but pain symptoms continued and was referred to Dr. Saleh.     In today's session, Dulce reported issues with her short term memory, concentration (especially with conversations), photophobia, especially headlights of cars, dizziness when standing from sitting, and is oversensitive to movement. She noted tingling in the legs that began three weeks ago, most often when lying down. She wondered if it is a pressure point concern. She identified pain in her lower and upper back, with radiation to her left arm and neck. She said that headaches are often in her forehead by her eyes, and stated that she experiences migraines frequently. Dulce noted a drop in appetite due to nausea, with improvement since the start of the accident. She reported that her sleep \"is horrible\" but that " since being on medication for sleep, she has had two good nights. She noted waking in the middle of the night and finding it very hard to go back to sleep. She denied any phonophobia.      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    Background Information:  Dulce is working as a  since March of 2020. She would like to go back to work as soon as she is able. She said that she is also working on building a non-profit for people in recovery from substance use. She also noted that she is writing a book about her own recovery story. She currently lives alone. She has 2 adult sons who live within a day's drive. She has four grandchildren as well. Dulce has been  for 37 years. Dulce has a history of attending outpatient and inpatient mental health for issues related to pain for RSD in 2004 to 2006. She noted that she also has a notable history for alcohol and drug use, but stopped drinking in 1995 and stopped using drugs in 2000. She noted that, in addition to being with her children and grandchildren, she enjoys gardening/growing flowers, and finds solace in listening to scripture. Dulce is currently smoking cigarettes, but is trying to quit.     SUMMARY OF TEST RESULTS:   Tests Administered:  Luke Anxiety Inventory, Luke Depression Inventory - Second Edition, Pastora Sierra Executive Function System (DKEFS) Verbal Fluency,  Green's Word Memory Test, Chelsea-Reitan Garber-Making subtests A & B, PTSD Checklist, Post Concussion Symptom Scale, Wechsler Adult Intelligence Scale - Fourth Edition (WAIS-IV) Digit Span and Coding subtests.    Behavioral Observations:  Dulce arrived on time for the evaluation.  She was appropriately dressed and groomed.  Motor behavior was unremarkable.  No issues were noted with vision or  hearing.  Speech was within normal limits for rate, volume, prosody, and articulation.  Speech content was logical and goal-directed.  Affect was appropriate and congruent with mood, which was euthymic and hopeful      Symptom Validity:  On measures of symptom validity, Dulce's scores did not reflect any concerns with effort.     Test Results:  Dulce presented with auditory working memory  In the average range (37th percentile), though there was some variability on the subtests that generate her overall score. Dulce was within the average range on a task that required her to repeat a series of random digits of increasing length forwards (63rd percentile) and on a task that required to repeat random digits of increasing length in reverse sequence (50th percentile). Her score fell to the low average range when she had to repeat the sequence but in order from the lowest to highest digit (9th percentile). Her response time for backwards responding was longer and she would pause in her responses as she tried to recall the order. On the more cognitively taxing sequencing task, she was observed to repeat sequences under her breat, but could not recall greater than 4 digits in length.      Dulce, when compared to a group of adult normal controls, demonstrated mixed abilities on a visual-verbal memory task that required her to learn and recall a list of word-pairs presented on a computer screen. When tasked with immediate and delayed recall (forced choice), she recalled all of the words (high average range,  57th percentile). She also performed within the high average range on a measure of multiple choice recognition (57th percentile). On a task of paired associate recall, which included a prompt, her score fell in the average range (39th percentile). When she was tasked with freely recalling the items on the list with a delay, her performance fell in the low end of the average range (25th percentile). Her  performance fell to the very low range (8th percentile) when tasked with free recall after a long delay.      On a task involving speeded visual-motor sequencing of numbers, her performance was in the high average range (87th percentile). When complexity was increased with a set switching demand that required her to alternate between numbers and letters in sequence, her speed was also in the high average range (84th percentile). She made two errors on the sequencing task, with one being self-corrected and the other examiner corrected. She was observed to life the pencil off the paper on the set switching task, despite instructions to not do.    On verbal fluency tasks, she performed consistently within the average range.  She performed consistently when required to rapidly produce words based on a phonemic cue and required to rapidly produce words based on a semantic category (both 63rd percentile) and did make one to two repetition and/or set-loss errors on each..  She demonstrated very slight variability when she was tasked with generating words from two semantic categories while switching back and forth between them. Her ability to generate words was in the high end of the average range (75th percentile) and her performance on switching between two categories was also in the average range (63rd percentile).     Dulce performed within the high end of the average range (75th percentile) and made no errors on a task of graphomotor speed that required her to code number/symbol pairs based on a key that was provided.      Dulce completed self-report inventories that assess for symptoms of anxiety, depression, and post-traumatic stress symptoms. On the anxiety measure, her overall symptom score was within the severe range. She reported she was mostly bothered by physical symptoms, such as feeling unsteady or lightheaded. She did note several symptoms that were cogitatively or physiological in nature, such as  experiencing a racing hear and shallow breathing, but also reports fears and nervousness. She reported in session that she is nervous about an upcoming surgery and that she is dealing with the pain and other symptoms of her concussion.  Her score on the depression measure was within the minimal range overall.  She endorsed slightly less energy and concentration, some increase in irritability, and a notable decrease in appetite and sleep. On the PTSD checklist, her scores indicated a possible PTSD diagnosis, as she endorsed symptoms such as having repeated memories of the experience and getting upset when reminded of it, avoiding conversations with regard to the event, having lower interest and in activities, being more alert, difficulty concentration and with sleep.     SUMMARY AND RECOMMENDATIONS:   Dulce did not demonstrate any issues with effort testing. She presented with performance on  screening measures of cognitive functioning that was mostly within or better than the expected range. However, she did demonstrate some areas with below average performance. She presented with difficulties with memory when given time delays and no cues. She also had some difficulty with a complex auditory memory task. On screening measures of emotional functioning, she endorsed anxiety and PTSD symptoms. She identified more concerns with physical functioning than with worried thoughts, but did identify anxiety related cognitions.        Based on these results, Dulce's therapy program of vestibular and occupational therapies appear to be appropriate. She may benefit from a physical therapy evaluation to determine appropriate treatment for the tingling in her legs and continued report of back pain. It is recommended that speech therapy be deferred at this time, based on her testing results.  In my opinion, her concerns with cognitive functioning will resolve as her physical and emotional symptoms are addressed in therapies.        To facilitate her recovery, more active intervention is recommended for sleep issues and symptoms of emotional distress, since such symptoms are associated with longer recovery times. This was discussed with Dulce and options such as individual counseling and medication were discussed.  Dulce stated a preference for time-limited individual therapy to focus on specific strategies for reducing her symptoms of anxiety and addressing the effects of her fall and was provided with information about individual therapy at [facility] and about ways to find community referrals.     Thank you for the opportunity to participate in Dulce's care.  If there are any questions on this report, I can be reached at [phone].     A total of 4 hours was spent in chart review, test selection, clinical interview, test administration and scoring, clinical interpretation, and report writing.       MALGORZATA [email or web address]  10/23/20 9:41 AM

## 2020-10-26 ENCOUNTER — TRANSCRIBE ORDERS (OUTPATIENT)
Dept: REGISTRATION | Facility: HOSPITAL | Age: 60
End: 2020-10-26

## 2020-10-26 ENCOUNTER — ANESTHESIA EVENT (OUTPATIENT)
Dept: OPERATING ROOM | Facility: HOSPITAL | Age: 60
Setting detail: SURGERY ADMIT
DRG: 469 | End: 2020-10-26
Payer: MEDICARE

## 2020-10-26 ENCOUNTER — APPOINTMENT (OUTPATIENT)
Dept: LAB | Facility: HOSPITAL | Age: 60
End: 2020-10-26
Attending: PHYSICIAN ASSISTANT
Payer: MEDICARE

## 2020-10-26 ENCOUNTER — APPOINTMENT (OUTPATIENT)
Dept: PREADMISSION TESTING | Facility: HOSPITAL | Age: 60
End: 2020-10-26
Attending: PODIATRIST
Payer: MEDICARE

## 2020-10-26 ENCOUNTER — TRANSCRIBE ORDERS (OUTPATIENT)
Dept: LAB | Facility: HOSPITAL | Age: 60
End: 2020-10-26

## 2020-10-26 DIAGNOSIS — M19.071 PRIMARY OSTEOARTHRITIS, RIGHT ANKLE AND FOOT: Primary | ICD-10-CM

## 2020-10-26 DIAGNOSIS — Z11.59 ENCOUNTER FOR SCREENING FOR OTHER VIRAL DISEASES: Primary | ICD-10-CM

## 2020-10-26 DIAGNOSIS — Z01.818 PRE-OP TESTING: Primary | ICD-10-CM

## 2020-10-26 DIAGNOSIS — T84.038A MECHANICAL LOOSENING OF OTHER INTERNAL PROSTHETIC JOINT, INITIAL ENCOUNTER (CMS/HCC): ICD-10-CM

## 2020-10-26 DIAGNOSIS — M19.071 PRIMARY OSTEOARTHRITIS, RIGHT ANKLE AND FOOT: ICD-10-CM

## 2020-10-26 DIAGNOSIS — Z11.59 ENCOUNTER FOR SCREENING FOR OTHER VIRAL DISEASES: ICD-10-CM

## 2020-10-26 LAB
25(OH)D3 SERPL-MCNC: 32 NG/ML (ref 30–100)
ABO + RH BLD: NORMAL
ALBUMIN SERPL-MCNC: 4.3 G/DL (ref 3.4–5)
ALP SERPL-CCNC: 87 IU/L (ref 35–126)
ALT SERPL-CCNC: 16 IU/L (ref 11–54)
ANION GAP SERPL CALC-SCNC: 11 MEQ/L (ref 3–15)
APTT PPP: 30 SEC (ref 23–35)
AST SERPL-CCNC: 23 IU/L (ref 15–41)
BASOPHILS # BLD: 0.01 K/UL (ref 0.01–0.1)
BASOPHILS NFR BLD: 0.2 %
BILIRUB SERPL-MCNC: 0.5 MG/DL (ref 0.3–1.2)
BLD GP AB SCN SERPL QL: NEGATIVE
BLOOD BANK CMNT PATIENT-IMP: NORMAL
BUN SERPL-MCNC: 10 MG/DL (ref 8–20)
CALCIUM SERPL-MCNC: 9.6 MG/DL (ref 8.9–10.3)
CHLORIDE SERPL-SCNC: 103 MEQ/L (ref 98–109)
CO2 SERPL-SCNC: 25 MEQ/L (ref 22–32)
CREAT SERPL-MCNC: 0.7 MG/DL (ref 0.6–1.1)
D AG BLD QL: POSITIVE
DIFFERENTIAL METHOD BLD: ABNORMAL
EOSINOPHIL # BLD: 0.04 K/UL (ref 0.04–0.36)
EOSINOPHIL NFR BLD: 1 %
ERYTHROCYTE [DISTWIDTH] IN BLOOD BY AUTOMATED COUNT: 12.3 % (ref 11.7–14.4)
EST. AVERAGE GLUCOSE BLD GHB EST-MCNC: 108 MG/DL
GFR SERPL CREATININE-BSD FRML MDRD: >60 ML/MIN/1.73M*2
GLUCOSE SERPL-MCNC: 79 MG/DL (ref 70–99)
HBA1C MFR BLD HPLC: 5.4 %
HCT VFR BLDCO AUTO: 42.3 % (ref 35–45)
HGB BLD-MCNC: 13.6 G/DL (ref 11.8–15.7)
IMM GRANULOCYTES # BLD AUTO: 0.01 K/UL (ref 0–0.08)
IMM GRANULOCYTES NFR BLD AUTO: 0.2 %
INR PPP: 0.9 INR
LABORATORY COMMENT REPORT: NORMAL
LYMPHOCYTES # BLD: 1.74 K/UL (ref 1.2–3.5)
LYMPHOCYTES NFR BLD: 42.3 %
MCH RBC QN AUTO: 30.7 PG (ref 28–33.2)
MCHC RBC AUTO-ENTMCNC: 32.2 G/DL (ref 32.2–35.5)
MCV RBC AUTO: 95.5 FL (ref 83–98)
MONOCYTES # BLD: 0.31 K/UL (ref 0.28–0.8)
MONOCYTES NFR BLD: 7.5 %
NEUTROPHILS # BLD: 2 K/UL (ref 1.7–7)
NEUTS SEG NFR BLD: 48.8 %
NRBC BLD-RTO: 0 %
PDW BLD AUTO: 9.2 FL (ref 9.4–12.3)
PLATELET # BLD AUTO: 313 K/UL (ref 150–369)
POTASSIUM SERPL-SCNC: 3.9 MEQ/L (ref 3.6–5.1)
PROT SERPL-MCNC: 6.8 G/DL (ref 6–8.2)
PROTHROMBIN TIME: 12.3 SEC (ref 12.2–14.5)
RBC # BLD AUTO: 4.43 M/UL (ref 3.93–5.22)
SODIUM SERPL-SCNC: 139 MEQ/L (ref 136–144)
WBC # BLD AUTO: 4.11 K/UL (ref 3.8–10.5)

## 2020-10-26 PROCEDURE — 84132 ASSAY OF SERUM POTASSIUM: CPT

## 2020-10-26 PROCEDURE — 85025 COMPLETE CBC W/AUTO DIFF WBC: CPT

## 2020-10-26 PROCEDURE — U0003 INFECTIOUS AGENT DETECTION BY NUCLEIC ACID (DNA OR RNA); SEVERE ACUTE RESPIRATORY SYNDROME CORONAVIRUS 2 (SARS-COV-2) (CORONAVIRUS DISEASE [COVID-19]), AMPLIFIED PROBE TECHNIQUE, MAKING USE OF HIGH THROUGHPUT TECHNOLOGIES AS DESCRIBED BY CMS-2020-01-R: HCPCS

## 2020-10-26 PROCEDURE — 86900 BLOOD TYPING SEROLOGIC ABO: CPT

## 2020-10-26 PROCEDURE — 85730 THROMBOPLASTIN TIME PARTIAL: CPT | Mod: GA

## 2020-10-26 PROCEDURE — 85610 PROTHROMBIN TIME: CPT | Mod: GA

## 2020-10-26 PROCEDURE — 36415 COLL VENOUS BLD VENIPUNCTURE: CPT

## 2020-10-26 PROCEDURE — 82306 VITAMIN D 25 HYDROXY: CPT | Mod: GA

## 2020-10-26 PROCEDURE — 83036 HEMOGLOBIN GLYCOSYLATED A1C: CPT | Mod: GA

## 2020-10-27 LAB — SARS-COV-2 RNA RESP QL NAA+PROBE: NOT DETECTED

## 2020-10-29 ENCOUNTER — DOCUMENTATION (OUTPATIENT)
Dept: OCCUPATIONAL THERAPY | Facility: REHABILITATION | Age: 60
End: 2020-10-29

## 2020-10-29 RX ORDER — CEFAZOLIN SODIUM/WATER 2 G/20 ML
2 SYRINGE (ML) INTRAVENOUS
Status: COMPLETED | OUTPATIENT
Start: 2020-10-30 | End: 2020-10-30

## 2020-10-29 NOTE — PROGRESS NOTES
OT DISCHARGE NOTE FOR OUTPATIENT THERAPY    Patient: Dulce Seo   MRN: [id number]  : 1960 60 y.o.  Referring Physician: No ref. provider found  Date of Visit: 10/29/2020      Certification Dates:  10/19/20 through 20    Total Visit Count: 1    Chief Complaints:   No chief complaint on file.                    OBJECTIVE MEASUREMENTS/DATA:    None Taken            Goals Addressed                 This Visit's Progress       General    • COMPLETED: OT Goals        Short Term Goals  Time Frame Result Comment   Full ocular motor range of motion and control with mild PCS symptoms 4 weeks Not met     Convergence less than or equal to 7 inches for near-focus tasks of reading and computer use with mild symptoms 4 weeks  Not met     Fair tolerance for normal volume and intensity of visual stimulation with mild symptoms after 20 minutes of engagement in a moderate to high multistim environment. 4 weeks Not met      Visual reaction time within or less than .75 seconds via Dynavision with minimal symptoms  4 weeks Not met     Saccadic fixation speed of less than 57 seconds as measured by the Jesse Devick Test with mild symptoms. 4 weeks  Not met     Patient will perform IADLs and community activities with decreased symptoms as evidenced by a 5 to 10 point reduction on the [place] Post Concussion Questionaire. 4 weeks  Not met     Patient will be independent with visual home exercise program. 4 weeks Not met      Long Term Goals  Time Frame Result Comment   Patient will complete necessary reading for work/leisure/school, with accommodations if indicated, with absent or manageable symptoms. By discharge  Not met     Patient will utilize computer for work/leisure/school tasks with accommodations if indicated with absent or manageable symptoms. By discharge  Not met     Patient will perform IADLs and community activities with absent or manageable symptoms as measured by a score of less than 20 on the [place]  Post Concussion Questionaire. By discharge  Not met                               Discharge information for CAR[facility]:

## 2020-10-30 ENCOUNTER — HOSPITAL ENCOUNTER (INPATIENT)
Facility: HOSPITAL | Age: 60
LOS: 3 days | Discharge: HOME HEALTH | DRG: 469 | End: 2020-11-02
Attending: PODIATRIST | Admitting: PODIATRIST
Payer: MEDICARE

## 2020-10-30 ENCOUNTER — APPOINTMENT (OUTPATIENT)
Dept: RADIOLOGY | Facility: HOSPITAL | Age: 60
Setting detail: SURGERY ADMIT
DRG: 469 | End: 2020-10-30
Attending: PODIATRIST
Payer: MEDICARE

## 2020-10-30 ENCOUNTER — ANESTHESIA (OUTPATIENT)
Dept: OPERATING ROOM | Facility: HOSPITAL | Age: 60
Setting detail: SURGERY ADMIT
DRG: 469 | End: 2020-10-30
Payer: MEDICARE

## 2020-10-30 PROBLEM — G89.18 POST-OP PAIN: Status: ACTIVE | Noted: 2020-10-30

## 2020-10-30 LAB
ABO + RH BLD: NORMAL
D AG BLD QL: POSITIVE
GLUCOSE BLD-MCNC: 148 MG/DL (ref 70–99)
LABORATORY COMMENT REPORT: NORMAL
POCT TEST: ABNORMAL

## 2020-10-30 PROCEDURE — 27200000 HC STERILE SUPPLY: Performed by: PODIATRIST

## 2020-10-30 PROCEDURE — 63600000 HC DRUGS/DETAIL CODE: Performed by: PODIATRIST

## 2020-10-30 PROCEDURE — 36415 COLL VENOUS BLD VENIPUNCTURE: CPT | Performed by: PODIATRIST

## 2020-10-30 PROCEDURE — C1776 JOINT DEVICE (IMPLANTABLE): HCPCS | Performed by: PODIATRIST

## 2020-10-30 PROCEDURE — 25800000 HC PHARMACY IV SOLUTIONS: Performed by: PODIATRIST

## 2020-10-30 PROCEDURE — 63600000 HC DRUGS/DETAIL CODE: Performed by: NURSE ANESTHETIST, CERTIFIED REGISTERED

## 2020-10-30 PROCEDURE — 37000001 HC ANESTHESIA GENERAL: Performed by: PODIATRIST

## 2020-10-30 PROCEDURE — 36000005 HC OR LEVEL 5 INITIAL 30MIN: Performed by: PODIATRIST

## 2020-10-30 PROCEDURE — 12000000 HC ROOM AND CARE MED/SURG

## 2020-10-30 PROCEDURE — 63700000 HC SELF-ADMINISTRABLE DRUG: Performed by: PODIATRIST

## 2020-10-30 PROCEDURE — 63600000 HC DRUGS/DETAIL CODE: Performed by: ANESTHESIOLOGY

## 2020-10-30 PROCEDURE — 25000000 HC PHARMACY GENERAL: Performed by: NURSE ANESTHETIST, CERTIFIED REGISTERED

## 2020-10-30 PROCEDURE — 36000015 HC OR LEVEL 5 EA ADDL MIN: Performed by: PODIATRIST

## 2020-10-30 PROCEDURE — 0SPF0JZ REMOVAL OF SYNTHETIC SUBSTITUTE FROM RIGHT ANKLE JOINT, OPEN APPROACH: ICD-10-PCS | Performed by: PODIATRIST

## 2020-10-30 PROCEDURE — 73600 X-RAY EXAM OF ANKLE: CPT | Mod: RT

## 2020-10-30 PROCEDURE — 71000011 HC PACU PHASE 1 EA ADDL MIN: Performed by: PODIATRIST

## 2020-10-30 PROCEDURE — C1713 ANCHOR/SCREW BN/BN,TIS/BN: HCPCS | Performed by: PODIATRIST

## 2020-10-30 PROCEDURE — 71000001 HC PACU PHASE 1 INITIAL 30MIN: Performed by: PODIATRIST

## 2020-10-30 PROCEDURE — 0SRF0J9 REPLACEMENT OF RIGHT ANKLE JOINT WITH SYNTHETIC SUBSTITUTE, CEMENTED, OPEN APPROACH: ICD-10-PCS | Performed by: PODIATRIST

## 2020-10-30 DEVICE — IMPLANTABLE DEVICE: Type: IMPLANTABLE DEVICE | Site: ANKLE | Status: FUNCTIONAL

## 2020-10-30 DEVICE — CEMENT BONE SIMPLEX FULL DOSE: Type: IMPLANTABLE DEVICE | Site: ANKLE | Status: FUNCTIONAL

## 2020-10-30 RX ORDER — DEXTROSE 50 % IN WATER (D50W) INTRAVENOUS SYRINGE
25 AS NEEDED
Status: DISCONTINUED | OUTPATIENT
Start: 2020-10-30 | End: 2020-11-02 | Stop reason: HOSPADM

## 2020-10-30 RX ORDER — OXYCODONE HYDROCHLORIDE 5 MG/1
5 TABLET ORAL EVERY 4 HOURS PRN
Status: DISCONTINUED | OUTPATIENT
Start: 2020-10-30 | End: 2020-10-30

## 2020-10-30 RX ORDER — CYCLOBENZAPRINE HCL 5 MG
5 TABLET ORAL EVERY 8 HOURS PRN
Status: DISCONTINUED | OUTPATIENT
Start: 2020-10-30 | End: 2020-11-02 | Stop reason: HOSPADM

## 2020-10-30 RX ORDER — ONDANSETRON HYDROCHLORIDE 2 MG/ML
INJECTION, SOLUTION INTRAVENOUS AS NEEDED
Status: DISCONTINUED | OUTPATIENT
Start: 2020-10-30 | End: 2020-10-30 | Stop reason: SURG

## 2020-10-30 RX ORDER — KETOROLAC TROMETHAMINE 15 MG/ML
15 INJECTION, SOLUTION INTRAMUSCULAR; INTRAVENOUS EVERY 6 HOURS PRN
Status: DISCONTINUED | OUTPATIENT
Start: 2020-10-30 | End: 2020-11-02 | Stop reason: HOSPADM

## 2020-10-30 RX ORDER — ALUMINUM HYDROXIDE, MAGNESIUM HYDROXIDE, AND SIMETHICONE 1200; 120; 1200 MG/30ML; MG/30ML; MG/30ML
30 SUSPENSION ORAL EVERY 4 HOURS PRN
Status: DISCONTINUED | OUTPATIENT
Start: 2020-10-30 | End: 2020-11-02 | Stop reason: HOSPADM

## 2020-10-30 RX ORDER — SENNOSIDES 8.6 MG/1
1 TABLET ORAL 2 TIMES DAILY PRN
Status: DISCONTINUED | OUTPATIENT
Start: 2020-10-30 | End: 2020-11-02 | Stop reason: HOSPADM

## 2020-10-30 RX ORDER — ONDANSETRON HYDROCHLORIDE 2 MG/ML
4 INJECTION, SOLUTION INTRAVENOUS
Status: DISCONTINUED | OUTPATIENT
Start: 2020-10-30 | End: 2020-10-30 | Stop reason: HOSPADM

## 2020-10-30 RX ORDER — ROPIVACAINE HYDROCHLORIDE 5 MG/ML
INJECTION, SOLUTION EPIDURAL; INFILTRATION; PERINEURAL
Status: COMPLETED | OUTPATIENT
Start: 2020-10-30 | End: 2020-10-30

## 2020-10-30 RX ORDER — SODIUM CHLORIDE 9 MG/ML
INJECTION, SOLUTION INTRAVENOUS CONTINUOUS
Status: DISCONTINUED | OUTPATIENT
Start: 2020-10-30 | End: 2020-11-02 | Stop reason: HOSPADM

## 2020-10-30 RX ORDER — ONDANSETRON HYDROCHLORIDE 2 MG/ML
4 INJECTION, SOLUTION INTRAVENOUS EVERY 8 HOURS PRN
Status: DISCONTINUED | OUTPATIENT
Start: 2020-10-30 | End: 2020-11-02 | Stop reason: HOSPADM

## 2020-10-30 RX ORDER — HYDROMORPHONE HYDROCHLORIDE 1 MG/ML
1 INJECTION, SOLUTION INTRAMUSCULAR; INTRAVENOUS; SUBCUTANEOUS
Status: DISCONTINUED | OUTPATIENT
Start: 2020-10-30 | End: 2020-10-30

## 2020-10-30 RX ORDER — MIDAZOLAM HYDROCHLORIDE 2 MG/2ML
INJECTION, SOLUTION INTRAMUSCULAR; INTRAVENOUS AS NEEDED
Status: DISCONTINUED | OUTPATIENT
Start: 2020-10-30 | End: 2020-10-30 | Stop reason: SURG

## 2020-10-30 RX ORDER — HYDROMORPHONE HYDROCHLORIDE 1 MG/ML
0.5 INJECTION, SOLUTION INTRAMUSCULAR; INTRAVENOUS; SUBCUTANEOUS
Status: DISCONTINUED | OUTPATIENT
Start: 2020-10-30 | End: 2020-10-30

## 2020-10-30 RX ORDER — ROSUVASTATIN CALCIUM 5 MG/1
2.5 TABLET, COATED ORAL NIGHTLY
Status: DISCONTINUED | OUTPATIENT
Start: 2020-10-30 | End: 2020-11-02 | Stop reason: HOSPADM

## 2020-10-30 RX ORDER — HYDROMORPHONE HYDROCHLORIDE 1 MG/ML
INJECTION, SOLUTION INTRAMUSCULAR; INTRAVENOUS; SUBCUTANEOUS AS NEEDED
Status: DISCONTINUED | OUTPATIENT
Start: 2020-10-30 | End: 2020-10-30 | Stop reason: SURG

## 2020-10-30 RX ORDER — CYCLOBENZAPRINE HCL 10 MG
5 TABLET ORAL AS NEEDED
Status: DISCONTINUED | OUTPATIENT
Start: 2020-10-30 | End: 2020-10-30

## 2020-10-30 RX ORDER — IBUPROFEN 200 MG
16-32 TABLET ORAL AS NEEDED
Status: DISCONTINUED | OUTPATIENT
Start: 2020-10-30 | End: 2020-10-30 | Stop reason: HOSPADM

## 2020-10-30 RX ORDER — ACETAMINOPHEN 325 MG/1
975 TABLET ORAL EVERY 8 HOURS
Status: DISCONTINUED | OUTPATIENT
Start: 2020-10-30 | End: 2020-11-02 | Stop reason: HOSPADM

## 2020-10-30 RX ORDER — EPHEDRINE SULFATE/0.9% NACL/PF 50 MG/5 ML
SYRINGE (ML) INTRAVENOUS AS NEEDED
Status: DISCONTINUED | OUTPATIENT
Start: 2020-10-30 | End: 2020-10-30 | Stop reason: SURG

## 2020-10-30 RX ORDER — OXYCODONE HYDROCHLORIDE 5 MG/1
5 TABLET ORAL EVERY 4 HOURS PRN
Status: DISCONTINUED | OUTPATIENT
Start: 2020-10-30 | End: 2020-10-31

## 2020-10-30 RX ORDER — DIPHENHYDRAMINE HCL 25 MG
25 CAPSULE ORAL EVERY 6 HOURS PRN
Status: DISCONTINUED | OUTPATIENT
Start: 2020-10-30 | End: 2020-11-02 | Stop reason: HOSPADM

## 2020-10-30 RX ORDER — ONDANSETRON 4 MG/1
4 TABLET, ORALLY DISINTEGRATING ORAL EVERY 8 HOURS PRN
Status: DISCONTINUED | OUTPATIENT
Start: 2020-10-30 | End: 2020-11-02 | Stop reason: HOSPADM

## 2020-10-30 RX ORDER — DEXTROSE 50 % IN WATER (D50W) INTRAVENOUS SYRINGE
25 AS NEEDED
Status: DISCONTINUED | OUTPATIENT
Start: 2020-10-30 | End: 2020-10-30 | Stop reason: HOSPADM

## 2020-10-30 RX ORDER — ENOXAPARIN SODIUM 100 MG/ML
40 INJECTION SUBCUTANEOUS
Status: DISCONTINUED | OUTPATIENT
Start: 2020-10-30 | End: 2020-11-02 | Stop reason: HOSPADM

## 2020-10-30 RX ORDER — DEXTROSE 40 %
15-30 GEL (GRAM) ORAL AS NEEDED
Status: DISCONTINUED | OUTPATIENT
Start: 2020-10-30 | End: 2020-10-30 | Stop reason: HOSPADM

## 2020-10-30 RX ORDER — FENTANYL CITRATE 50 UG/ML
INJECTION, SOLUTION INTRAMUSCULAR; INTRAVENOUS AS NEEDED
Status: DISCONTINUED | OUTPATIENT
Start: 2020-10-30 | End: 2020-10-30 | Stop reason: SURG

## 2020-10-30 RX ORDER — PROPOFOL 10 MG/ML
INJECTION, EMULSION INTRAVENOUS AS NEEDED
Status: DISCONTINUED | OUTPATIENT
Start: 2020-10-30 | End: 2020-10-30 | Stop reason: SURG

## 2020-10-30 RX ORDER — IBUPROFEN 200 MG
16-32 TABLET ORAL AS NEEDED
Status: DISCONTINUED | OUTPATIENT
Start: 2020-10-30 | End: 2020-11-02 | Stop reason: HOSPADM

## 2020-10-30 RX ORDER — FENTANYL CITRATE 50 UG/ML
50 INJECTION, SOLUTION INTRAMUSCULAR; INTRAVENOUS
Status: DISCONTINUED | OUTPATIENT
Start: 2020-10-30 | End: 2020-10-30 | Stop reason: HOSPADM

## 2020-10-30 RX ORDER — DEXAMETHASONE SODIUM PHOSPHATE 4 MG/ML
INJECTION, SOLUTION INTRA-ARTICULAR; INTRALESIONAL; INTRAMUSCULAR; INTRAVENOUS; SOFT TISSUE AS NEEDED
Status: DISCONTINUED | OUTPATIENT
Start: 2020-10-30 | End: 2020-10-30 | Stop reason: SURG

## 2020-10-30 RX ORDER — AMITRIPTYLINE HYDROCHLORIDE 25 MG/1
25 TABLET, FILM COATED ORAL NIGHTLY
Status: DISCONTINUED | OUTPATIENT
Start: 2020-10-30 | End: 2020-11-02 | Stop reason: HOSPADM

## 2020-10-30 RX ORDER — DIPHENHYDRAMINE HCL 50 MG/ML
25 VIAL (ML) INJECTION EVERY 6 HOURS PRN
Status: DISCONTINUED | OUTPATIENT
Start: 2020-10-30 | End: 2020-11-02 | Stop reason: HOSPADM

## 2020-10-30 RX ORDER — DEXTROSE 40 %
15-30 GEL (GRAM) ORAL AS NEEDED
Status: DISCONTINUED | OUTPATIENT
Start: 2020-10-30 | End: 2020-11-02 | Stop reason: HOSPADM

## 2020-10-30 RX ORDER — LEVOTHYROXINE SODIUM 75 UG/1
75 TABLET ORAL
Status: DISCONTINUED | OUTPATIENT
Start: 2020-10-31 | End: 2020-11-02 | Stop reason: HOSPADM

## 2020-10-30 RX ADMIN — AMITRIPTYLINE HYDROCHLORIDE 25 MG: 25 TABLET, FILM COATED ORAL at 22:01

## 2020-10-30 RX ADMIN — SODIUM CHLORIDE: 9 INJECTION, SOLUTION INTRAVENOUS at 17:41

## 2020-10-30 RX ADMIN — ROPIVACAINE HYDROCHLORIDE 30 ML: 5 INJECTION, SOLUTION EPIDURAL; INFILTRATION; PERINEURAL at 11:35

## 2020-10-30 RX ADMIN — ONDANSETRON 4 MG: 2 INJECTION INTRAMUSCULAR; INTRAVENOUS at 12:00

## 2020-10-30 RX ADMIN — OXYCODONE HYDROCHLORIDE 5 MG: 5 TABLET ORAL at 22:20

## 2020-10-30 RX ADMIN — DEXAMETHASONE SODIUM PHOSPHATE 4 MG: 4 INJECTION, SOLUTION INTRAMUSCULAR; INTRAVENOUS at 12:00

## 2020-10-30 RX ADMIN — Medication 20 MG: at 12:13

## 2020-10-30 RX ADMIN — Medication 2 G: at 11:53

## 2020-10-30 RX ADMIN — ONDANSETRON 4 MG: 4 TABLET, ORALLY DISINTEGRATING ORAL at 22:23

## 2020-10-30 RX ADMIN — Medication 10 MG: at 12:53

## 2020-10-30 RX ADMIN — HYDROMORPHONE HYDROCHLORIDE 0.5 MG: 1 INJECTION, SOLUTION INTRAMUSCULAR; INTRAVENOUS; SUBCUTANEOUS at 13:27

## 2020-10-30 RX ADMIN — SODIUM CHLORIDE 1 G: 900 INJECTION INTRAVENOUS at 20:47

## 2020-10-30 RX ADMIN — SODIUM CHLORIDE: 9 INJECTION, SOLUTION INTRAVENOUS at 09:38

## 2020-10-30 RX ADMIN — FENTANYL CITRATE 100 MCG: 50 INJECTION INTRAMUSCULAR; INTRAVENOUS at 11:30

## 2020-10-30 RX ADMIN — ACETAMINOPHEN 975 MG: 325 TABLET, FILM COATED ORAL at 17:12

## 2020-10-30 RX ADMIN — MIDAZOLAM HYDROCHLORIDE 2 MG: 1 INJECTION, SOLUTION INTRAMUSCULAR; INTRAVENOUS at 11:30

## 2020-10-30 RX ADMIN — ACETAMINOPHEN 975 MG: 325 TABLET, FILM COATED ORAL at 21:59

## 2020-10-30 RX ADMIN — ROPIVACAINE HYDROCHLORIDE 12 ML: 5 INJECTION, SOLUTION EPIDURAL; INFILTRATION; PERINEURAL at 11:43

## 2020-10-30 RX ADMIN — PROPOFOL 180 MG: 10 INJECTION, EMULSION INTRAVENOUS at 11:58

## 2020-10-30 RX ADMIN — ROSUVASTATIN CALCIUM 2.5 MG: 5 TABLET, FILM COATED ORAL at 22:01

## 2020-10-30 RX ADMIN — ENOXAPARIN SODIUM 40 MG: 40 INJECTION SUBCUTANEOUS at 20:47

## 2020-10-30 ASSESSMENT — LIFESTYLE VARIABLES: TOBACCO_USE: 1

## 2020-10-30 ASSESSMENT — ENCOUNTER SYMPTOMS: HEADACHES: 1

## 2020-10-30 NOTE — ANESTHESIA PROCEDURE NOTES
Airway  Urgency: elective    Start Time: 10/30/2020 12:00 PM  Airway not difficult    General Information and Staff    Patient location during procedure: OR  Anesthesiologist: Lena Camarillo DO  Resident/CRNA: Aida Yang CRNA  Performed: resident/CRNA     Indications and Patient Condition  Indications for airway management: anesthesia  Sedation level: general  Preoxygenated: yes  Patient position: sniffing  MILS maintained throughout  Mask difficulty assessment: 1 - vent by mask    Final Airway Details  Final airway type: supraglottic airway      Successful airway: classic  Size 4    Number of attempts at approach: 1  Atraumatic airway insertion

## 2020-10-30 NOTE — ANESTHESIA PROCEDURE NOTES
Peripheral Block    Patient location during procedure: pre-op  Start time: 10/30/2020 11:38 AM  End time: 10/30/2020 11:44 AM  Reason for block: at surgeon's request and post-op pain management  Staffing  Anesthesiologist: Lena Camarillo DO  Performed: anesthesiologist   Preanesthetic Checklist  Completed: patient identified, site marked, surgical consent, pre-op evaluation, timeout performed, IV checked, risks and benefits discussed, monitors and equipment checked and sterile field maintained during procedure  Peripheral Block  Patient position: supine  Prep: ChloraPrep  Patient monitoring: heart rate, continuous pulse ox and blood pressure  Block type: saphenous  Laterality: right  Injection technique: single-shot      Procedures: ultrasound guided  Image captured and stored.        Local infiltration: ropivacaine  Infiltration strength: 0.5 %  Dose: 12 mL  Needle  Needle gauge: 20 G  Needle length: 4 in  Needle localization: nerve stimulator and ultrasound guidance  Assessment  Injection assessment: negative aspiration for heme, no paresthesia on injection, incremental injection and local visualized surrounding nerve on ultrasound  Paresthesia pain: none  Heart rate change: no  Slow fractionated injection: yes  Medications Administered - 10/30/2020 11:43 AM   Ropivacaine PF (NAROPIN) injection 0.5 %, 12 mL

## 2020-10-30 NOTE — ANESTHESIA PREPROCEDURE EVALUATION
Relevant Problems   No relevant active problems       Anesthesia ROS/MED HX    Anesthesia History    Previous anesthetics  Pulmonary    history of tobacco use and ex-smoker  Neuro/Psych    Headaches   Anxiety  Cardiovascular- neg   Covid19 Test Reviewed and ECG reviewed   Normal ECG    Hematological - neg  GI/Hepatic   GERD    Control: well controlled  Musculoskeletal   Osteoarthritis  Renal Disease- neg  Endo/Other   Hyperthyroidism  Body Habitus: Normal       Past Surgical History:   Procedure Laterality Date   • COLONOSCOPY     • COSMETIC SURGERY      breast augmentation   • FINGER SURGERY      tip of finger amputated   • JOINT REPLACEMENT Right     ankle (first 2013. scheduled for 10/30/2020)       Physical Exam    Airway   Mallampati: I   TM distance: >3 FB   Neck ROM: full  Cardiovascular - normal   Rhythm: regular   Rate: normalPulmonary - normal   clear to auscultation  Dental - normal        Anesthesia Plan    Plan: general    Technique: general LMA     Airway: oral intubation       patient did not smoke on day of surgery  ASA 2  Anesthetic plan and risks discussed with: patient  Induction:    intravenous   Postop Plan:   Patient Disposition: inpatient floor planned admission   Pain Management: IV analgesics  Comments:    Plan: Pop/saph block discussed, pt states understanding and consented.

## 2020-10-30 NOTE — BRIEF OP NOTE
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, initial encounter (CMS/[facility]) [T84.038A]    Surgeon(s) and Role:     * Todd Chopra DPM - Primary     * Emy Ibarra DPM - Resident - Assisting     * Dilip Britton DPM - Fellow Assisting    Anesthesia: LMA, POP/SAPH block    Staff:   Circulator: Kasey El RN; Kelly Linda RN  Scrub Person: Taylor, Corrinne, RNFA    Procedure Details   See full op note    Estimated Blood Loss: 25 mL    Specimens:                Order Name Source Comment Collection Info Order Time   REPEAT ABO/RH (TYPE CHECK) Blood, Venous  Collected By: Shilpa Santiago RN 10/30/2020  8:48 AM         Drains: * No LDAs found *    Implants:   Implant Name Type Inv. Item Serial No.  Lot No. LRB No. Used Action   CEMENT BONE SIMPLEX FULL DOSE - [id number]  CEMENT BONE SIMPLEX FULL DOSE  [employer] [id number] Right 1 Implanted   inbone tibial base stem    [employer] TECH [id number] Right 1 Implanted   inbone tibial top stem    EAST [employer] TECH [id number] Right 1 Implanted   invision talardome    ESAT MEDICAL TECH [id number] Right 1 Implanted   [employer]vision [employer] Pegged Talar Plate right    [employer]AST [employer] TECH [id number] Right 1 Implanted   inbone tibial tray right    Non[employer] TECH [id number] Right 1 Implanted   inbone tibial mid stem    EAST [employer] TECH [id number] Right 1 Implanted   STEM TIBIAL INBONE MID 16MM - [id number]  STEM TIBIAL INBONE MID 16MM  EAST [employer] TECH [id number] Right 1 Implanted   inbone tibial  mid stem    [employer] [id number] Right 1 Implanted   INSERT POLY SIZE 3 - [id number]  INSERT POLY SIZE 3  [employer] [id number] Right 1 Implanted              Complications:  None; patient tolerated the procedure well.           Disposition: PACU - hemodynamically stable.           Condition: stable    Todd Chopra DPM  Phone Number: [phone]

## 2020-10-30 NOTE — ANESTHESIOLOGIST PRE-PROCEDURE ATTESTATION
Pre-Procedure Patient Identification:  I am the Primary Anesthesiologist and have identified the patient on 10/30/20 at 9:25 AM.   I have confirmed the following procedure(s) Right Total Ankle Revision, Achilles Tendon Lengthening, Lateral Ankle Stabilization (R) will be performed by the following surgeon/proceduralist Todd Chopra DPM.

## 2020-10-30 NOTE — H&P
Podiatry History and Physical    POD #0 Right total ankle replacement revision     HPI     Patient is a 60 y.o. female who presents with POD #0 Right total ankle replacement revision. Patient has a past medical history of hyperthyroidism, GERD, anemia.Patient had a total ankle replacement in 2013. Patient denies any fever nausea or vomitting.     Medical History:   Past Medical History:   Diagnosis Date   • Anemia     past hx   • Ankle sprain    • Arthritis    • Claustrophobia    • Concussion     September 1 st, had fall and concussion   • Dizziness    • GERD (gastroesophageal reflux disease)     past hx   • Hyperthyroidism    • Infection     right middle finger/ that her tip was amputated because of infection   • Low back strain    • Neck strain    • RSD (reflex sympathetic dystrophy)     left side of body/ edith left arm   • Shoulder injury        Surgical History:   Past Surgical History:   Procedure Laterality Date   • COLONOSCOPY     • COSMETIC SURGERY      breast augmentation   • FINGER SURGERY      tip of finger amputated   • JOINT REPLACEMENT Right     ankle (first 2013. scheduled for 10/30/2020)       Social History:   Social History     Socioeconomic History   • Marital status:      Spouse name: None   • Number of children: 2   • Years of education: 10   • Highest education level: GED or equivalent   Occupational History   • Occupation:      Employer: OTHER     Comment: [employer]   Social Needs   • Financial resource strain: Not hard at all   • Food insecurity     Worry: Never true     Inability: Never true   • Transportation needs     Medical: No     Non-medical: No   Tobacco Use   • Smoking status: Current Every Day Smoker     Packs/day: 0.50     Years: 20.00     Pack years: 10.00     Types: Cigarettes   • Smokeless tobacco: Never Used   • Tobacco comment: 1/2ppd/20 yrs - attempts to quit time to time. stress causes re-use   Substance and Sexual Activity   • Alcohol use: Not  Currently     Frequency: Never     Binge frequency: Never     Comment: alchol abuse in past/ sober since 1995   • Drug use: Not Currently     Types: Methamphetamines, Cocaine     Comment: past hx of drug abuse/ sober since 2000   • Sexual activity: Not Currently   Lifestyle   • Physical activity     Days per week: None     Minutes per session: None   • Stress: None   Relationships   • Social connections     Talks on phone: None     Gets together: None     Attends Nondenominational service: None     Active member of club or organization: None     Attends meetings of clubs or organizations: None     Relationship status: None   • Intimate partner violence     Fear of current or ex partner: None     Emotionally abused: None     Physically abused: None     Forced sexual activity: None   Other Topics Concern   • None   Social History Narrative   • None       Family History: History reviewed. No pertinent family history.    Allergies: Bee sting [bee venom protein (honey bee)] and Codeine    Home Medications:  •  amitriptyline, Take 25 mg by mouth nightly.    •  cyclobenzaprine, Take 5 mg by mouth as needed for muscle spasms.  •  levothyroxine, Take 75 mcg by mouth daily.  •  rosuvastatin, Take 2.5 mg by mouth nightly.      Current Medications:  •  acetaminophen, 975 mg, oral, q8h ROSE  •  enoxaparin, 40 mg, subcutaneous, Daily (6p)  •  ketorolac, 15 mg, intravenous, q6h PRN  •  oxyCODONE, 5 mg, oral, q4h PRN  •  sodium chloride 0.9 %, , intravenous, Continuous    Physicial Exam    General appearance: alert, appears stated age and cooperative  Head: normocephalic, without obvious abnormality, atraumatic  Eyes: conjunctivae/corneas clear. PERRL, EOM's intact. Fundi benign.  Neck: no adenopathy, no carotid bruit, no JVD, supple, symmetrical, trachea midline and thyroid not enlarged, symmetric, no tenderness/mass/nodules  Back: symmetric, no curvature. ROM normal. No CVA tenderness.  Lungs: clear to auscultation bilaterally  Chest  wall: no tenderness  Heart: regular rate and rhythm, S1, S2 normal, no murmur, click, rub or gallop  Abdomen: soft, non-tender; bowel sounds normal; no masses, no organomegaly  Neurologic: Grossly normal    LE Exam  Vascular: Dressings remained intact.  No strike through noted.          Capillary refill time <3seconds B/L.  MSK: +DP/PF of digits   Derm: Dressings C/D/I. No strike through.  Neuro: Light touch and protective sensation is intact.     Labs  Ordered     Imaging  Imaging reviewed    A/P: 60 y.o. patient POD#0 s/p R total ankle replacement revision   -Patient tolerated the procedure well, stable in PACU. Will return to floor when ready per PACU protocol  -Abx per Id: currently Ancef q8 till D/C.   -NPO discontinued, diet reordered.   -Patient is to be NWB to the RLE with the use of crutches, walker, or wheelchair.   -Pain medication on board as patient needs.   -DVT prophylaxsis Lovenox   -If PT/OT clears her and pain is controlled she will be D/Nik tomorrow.   -Please contact on call podiatry resident with any questions or concerns     Dr. Emy Ibarra DPM, PGY3  Pager# [phone]

## 2020-10-30 NOTE — OP NOTE
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 The patient was transferred to the operating room table in the supine position. After adequate IV sedation the patient was prepped and draped in the usual aseptic manner. A timeout was performed, foot and leg exsanguinated and tourniquet elevated to 350 mmhg.     An incision was made at the anterior aspect of the ankle being careful to avoid any vital neurovascular structures. the superficial veins were cauterized as needed. A metzenbaum scissor was used to carefully dissect past the superficial peroneal nerve which was dissected free and retracted laterally. The incision was carried down to bone with a 15 blade with the deep neurovascular structures retrated laterally. A key elevator was used to remove the ankle capsule from the bone and a gelpy retractor was used to provide adequate exposure to the joint.     TIBIAL RESECTION   The Application Security Tibial Alignment Guide with three in-layed 2.4mm alignment pins was fitted to the anterior aspect of the tibia  and secured in place with two proximal 2.4mm tibial pins driven from anterior to posterior. Appropriate medial to lateral sizing and triplanar rotational and translational alignment was visualized clinically and confirmed with intraoperative fluoroscopy in the AP ankle view .   The Tibial Alignment Guide was then removed, The previous tibial implant was explanted and the Coronal Sizing Guide and Cutting guide Instrument was placed over the alignment pins.   Again, appropriate sizing and alignment was visualized clinically and confirmed with intraoperative fluoroscopy in AP and Lateral ankle views.An additional Divergent 2.4mm tibial pin was placed into the medial obliquely oriented tibial hole of the Resection Guide.     The two distal 2.4mm tibial pins and Divergent pin were trimmed flush and 2cm from the Resection Guide facing, respectively.   Next, utilizing the oscillating saw the Tibial resection was made in the Proximal, Medial and Lateral slots of the Resection Guide. Care was taken to protect against malleolar abutment or fracture.   The Divergent pin, the Resection Guide and two distal 2.4mm tibial pins were then removed.   Utilizing manual instrumentation the tibial resection of bone was removed en total and passed from the operative field. Loose bone pieces were removed manually and the joint was flushed with copious normal sterile saline.     TALAR RESECTION   The tibial spacer and prophecy patient specific talar spacer were then placed and pinned.  A 3 mm shims was used to properly balance the joint.  The talar resection guide was then inserted into the tibial spacer.  This was adjusted to the proper height and checked under fluoroscopy.  Two 2.4 pins were then placed in the 2 talar pinholes.  Fluoroscopy shots were taken to observe proper resection height compared to our preoperative planning.  The talar spatial spacer and tibial spacer were then removed the cut guide was then inserted across the  previously placed pins in the oblique pins were also placed into the talus.  The cut was then made with a reciprocating saw and the cut guide pins were then removed.  The bone was passed from the operative field.    The tibial stem alignment guide was then placed into the joint space the anterior mounted mounting plate was placed on top of the tibial stem alignment guide and the drill guide cartridge was inserted into the tibial stem alignment guide with a mounting plate fluoroscopy shots were checked taken to observe proper placement.  The [place] yg was inserted into the tibial alignment guide and checked under fluoroscopy fluoroscopy for proper alignment.  The MC10 C bracket was then installed the plantar incision was marked with a trocar tip that had been marked with a marking pen.  The plantar aspect of the jig was removed and incision was made with a 15 blade and a drill was used to drill into across the calcaneus talus and into the tibia under fluoroscopic guidance.  The tibia was then reamed under fluoroscopic guidance.  The C bracket was removed and the bushing attachment was left and pinned into the calcaneus.  The strike yg was inserted from distal to proximal fitting the tibial tray trial in place which was observed under fluoroscopy for proper sizing of the implant.    TRIALS    The talar trial and polyspacer trial were also inserted and aligned and under fluoroscopic guidance and pinned in place with two 2.4 Steinmann pins.  The poly and tibial trials were then removed and the talar peg holes were drilled.  Fluoroscopic shots were taken to observe no violation of the TN joint or the subtalar joint.  The talar tray was then removed.      FINAL IMPLANT    The tibial stems were then sequentially inserted into the tibia.  The tibial tray was then inserted with bone cement and impacted with a [place] until it seated properly into the tibia.  The talar plate was then impacted on the table and then  impacted onto the talus with bone cement.  Next the polyethylene insert was placed with the polyinsertertion tool.  Final fluoroscopic shots were taken maximally plantar flexing and dorsiflexing the ankle and observed to have proper placement of the hardware and anatomic reduction of the deformity.      CLOSURE    The surgical site was copiously irrigated with saline and closed in a layered fashion with 2-0 Vycril 3-0 monocryl and staples. The plantar incision was closed with 3-0 prolene.   The tourniquet was deflated and skin cleaned with a wet and dry lap sponge. A Prevena wound VAC was then applied. The foot was dressed with 4x4 gauze ABDs, Webril, posterior splint and ACE wrape. Seal on the VAC was noted to be excellent at this time. The tourniquet was deflated at 120 minutes.    The patient was transferred to the PACU with vital signs stable and neurovascular status intact to the right foot and ankle.       Estimated Blood Loss: 25 mL    Specimens:                Order Name Source Comment Collection Info Order Time   REPEAT ABO/RH (TYPE CHECK) Blood, Venous  Collected By: Shilpa Santiago RN 10/30/2020  8:48 AM         Drains: * No LDAs found *    Implants:   Implant Name Type Inv. Item Serial No.  Lot No. LRB No. Used Action   CEMENT BONE SIMPLEX FULL DOSE - [id number]  CEMENT BONE SIMPLEX FULL DOSE  [employer] [id number] Right 1 Implanted   inbone tibial base stem    [employer] TECH [id number] Right 1 Implanted   inbone tibial top stem    [employer] TECH [id number] Right 1 Implanted   invision talardome    [employer] TECH [id number] Right 1 Implanted   [employer] Pegged Talar Plate right    [employer] TECH [id number] Right 1 Implanted   inbone tibial tray right    [employer] TECH [id number] Right 1 Implanted   inbone tibial mid stem    [employer] TECH [id number] Right 1 Implanted   STEM TIBIAL INBONE MID 16MM - [id number]  STEM TIBIAL INBONE MID 16MM  [employer] TECH [id number] Right 1  Implanted   inbone tibial mid stem    Played [id number] Right 1 Implanted   INSERT POLY SIZE 3 - [id number]  INSERT POLY SIZE 3  Played [id number] Right 1 Implanted              Complications:  None; patient tolerated the procedure well.           Disposition: PACU - hemodynamically stable.           Condition: stable    Todd Chopra, RASHAAD  Phone Number: [phone]

## 2020-10-30 NOTE — ANESTHESIA PROCEDURE NOTES
Peripheral Block    Patient location during procedure: pre-op  Start time: 10/30/2020 11:30 AM  End time: 10/30/2020 11:36 AM  Reason for block: at surgeon's request and post-op pain management  Staffing  Anesthesiologist: Lena Camarillo DO  Performed: anesthesiologist   Preanesthetic Checklist  Completed: patient identified, site marked, surgical consent, pre-op evaluation, timeout performed, IV checked, risks and benefits discussed, monitors and equipment checked and sterile field maintained during procedure  Peripheral Block  Patient position: left lateral decubitus  Prep: ChloraPrep  Patient monitoring: heart rate, continuous pulse ox and blood pressure  Block type: popliteal  Laterality: right  Injection technique: single-shot      Procedures: ultrasound guided and nerve stimulator  Image captured and stored.        Local infiltration: ropivacaine  Infiltration strength: 0.5 %  Dose: 30 mL  Needle  Needle gauge: 20 G  Needle length: 4 in  Needle localization: nerve stimulator and ultrasound guidance  Assessment  Injection assessment: negative aspiration for heme, no paresthesia on injection, incremental injection and local visualized surrounding nerve on ultrasound  Paresthesia pain: none  Heart rate change: no  Slow fractionated injection: yes  Medications Administered - 10/30/2020 11:35 AM   Ropivacaine PF (NAROPIN) injection 0.5 %, 30 mL

## 2020-10-30 NOTE — DISCHARGE INSTR - OTHER ORDERS
It is important that you quit smoking.  Please call [phone] to join [employer] [facility] [employer]'s free tobacco cessation program.  New programs begin in November.

## 2020-10-31 PROBLEM — R52 ACUTE PAIN: Status: ACTIVE | Noted: 2020-10-31

## 2020-10-31 PROBLEM — K59.00 CONSTIPATION: Status: ACTIVE | Noted: 2020-10-31

## 2020-10-31 LAB
ANION GAP SERPL CALC-SCNC: 8 MEQ/L (ref 3–15)
BUN SERPL-MCNC: 12 MG/DL (ref 8–20)
CALCIUM SERPL-MCNC: 8.4 MG/DL (ref 8.9–10.3)
CHLORIDE SERPL-SCNC: 105 MEQ/L (ref 98–109)
CO2 SERPL-SCNC: 25 MEQ/L (ref 22–32)
CREAT SERPL-MCNC: 0.5 MG/DL (ref 0.6–1.1)
ERYTHROCYTE [DISTWIDTH] IN BLOOD BY AUTOMATED COUNT: 12.6 % (ref 11.7–14.4)
GFR SERPL CREATININE-BSD FRML MDRD: >60 ML/MIN/1.73M*2
GLUCOSE BLD-MCNC: 123 MG/DL (ref 70–99)
GLUCOSE BLD-MCNC: 159 MG/DL (ref 70–99)
GLUCOSE SERPL-MCNC: 101 MG/DL (ref 70–99)
HCT VFR BLDCO AUTO: 31.6 % (ref 35–45)
HGB BLD-MCNC: 10.2 G/DL (ref 11.8–15.7)
MCH RBC QN AUTO: 31.1 PG (ref 28–33.2)
MCHC RBC AUTO-ENTMCNC: 32.3 G/DL (ref 32.2–35.5)
MCV RBC AUTO: 96.3 FL (ref 83–98)
PDW BLD AUTO: 8.9 FL (ref 9.4–12.3)
PLATELET # BLD AUTO: 237 K/UL (ref 150–369)
POCT TEST: ABNORMAL
POCT TEST: ABNORMAL
POTASSIUM SERPL-SCNC: 3.8 MEQ/L (ref 3.6–5.1)
RBC # BLD AUTO: 3.28 M/UL (ref 3.93–5.22)
SODIUM SERPL-SCNC: 138 MEQ/L (ref 136–144)
WBC # BLD AUTO: 7.44 K/UL (ref 3.8–10.5)

## 2020-10-31 PROCEDURE — 25800000 HC PHARMACY IV SOLUTIONS: Performed by: PODIATRIST

## 2020-10-31 PROCEDURE — 63600000 HC DRUGS/DETAIL CODE: Performed by: PODIATRIST

## 2020-10-31 PROCEDURE — 63700000 HC SELF-ADMINISTRABLE DRUG: Performed by: PODIATRIST

## 2020-10-31 PROCEDURE — 82310 ASSAY OF CALCIUM: CPT | Performed by: PODIATRIST

## 2020-10-31 PROCEDURE — 12000000 HC ROOM AND CARE MED/SURG

## 2020-10-31 PROCEDURE — 85027 COMPLETE CBC AUTOMATED: CPT | Performed by: PODIATRIST

## 2020-10-31 PROCEDURE — 99221 1ST HOSP IP/OBS SF/LOW 40: CPT | Performed by: NURSE PRACTITIONER

## 2020-10-31 RX ORDER — OXYCODONE HYDROCHLORIDE 5 MG/1
10-15 TABLET ORAL EVERY 4 HOURS PRN
Status: DISCONTINUED | OUTPATIENT
Start: 2020-10-31 | End: 2020-10-31

## 2020-10-31 RX ORDER — GABAPENTIN 250 MG/5ML
300 SOLUTION ORAL EVERY 8 HOURS
Status: DISCONTINUED | OUTPATIENT
Start: 2020-10-31 | End: 2020-11-02 | Stop reason: HOSPADM

## 2020-10-31 RX ORDER — LORAZEPAM 1 MG/1
1 TABLET ORAL ONCE
Status: COMPLETED | OUTPATIENT
Start: 2020-10-31 | End: 2020-10-31

## 2020-10-31 RX ORDER — OXYCODONE HYDROCHLORIDE 5 MG/1
10-15 TABLET ORAL EVERY 4 HOURS PRN
Status: DISCONTINUED | OUTPATIENT
Start: 2020-10-31 | End: 2020-11-02 | Stop reason: HOSPADM

## 2020-10-31 RX ORDER — HYDROMORPHONE HYDROCHLORIDE 1 MG/ML
1 INJECTION, SOLUTION INTRAMUSCULAR; INTRAVENOUS; SUBCUTANEOUS
Status: DISCONTINUED | OUTPATIENT
Start: 2020-10-31 | End: 2020-10-31

## 2020-10-31 RX ORDER — HYDROMORPHONE HYDROCHLORIDE 2 MG/1
1 TABLET ORAL
Status: DISCONTINUED | OUTPATIENT
Start: 2020-10-31 | End: 2020-10-31

## 2020-10-31 RX ORDER — HYDROMORPHONE HYDROCHLORIDE 1 MG/ML
1 INJECTION, SOLUTION INTRAMUSCULAR; INTRAVENOUS; SUBCUTANEOUS EVERY 6 HOURS PRN
Status: DISCONTINUED | OUTPATIENT
Start: 2020-10-31 | End: 2020-11-02 | Stop reason: HOSPADM

## 2020-10-31 RX ADMIN — SODIUM CHLORIDE 1 G: 900 INJECTION INTRAVENOUS at 03:49

## 2020-10-31 RX ADMIN — KETOROLAC TROMETHAMINE 15 MG: 15 INJECTION, SOLUTION INTRAMUSCULAR; INTRAVENOUS at 00:21

## 2020-10-31 RX ADMIN — SODIUM CHLORIDE: 9 INJECTION, SOLUTION INTRAVENOUS at 05:55

## 2020-10-31 RX ADMIN — OXYCODONE HYDROCHLORIDE 15 MG: 5 TABLET ORAL at 15:24

## 2020-10-31 RX ADMIN — KETOROLAC TROMETHAMINE 15 MG: 15 INJECTION, SOLUTION INTRAMUSCULAR; INTRAVENOUS at 21:35

## 2020-10-31 RX ADMIN — OXYCODONE HYDROCHLORIDE 15 MG: 5 TABLET ORAL at 20:16

## 2020-10-31 RX ADMIN — KETOROLAC TROMETHAMINE 15 MG: 15 INJECTION, SOLUTION INTRAMUSCULAR; INTRAVENOUS at 13:17

## 2020-10-31 RX ADMIN — SODIUM CHLORIDE 1 G: 900 INJECTION INTRAVENOUS at 20:16

## 2020-10-31 RX ADMIN — AMITRIPTYLINE HYDROCHLORIDE 25 MG: 25 TABLET, FILM COATED ORAL at 21:31

## 2020-10-31 RX ADMIN — ENOXAPARIN SODIUM 40 MG: 40 INJECTION SUBCUTANEOUS at 17:54

## 2020-10-31 RX ADMIN — ACETAMINOPHEN 975 MG: 325 TABLET, FILM COATED ORAL at 13:14

## 2020-10-31 RX ADMIN — OXYCODONE HYDROCHLORIDE 5 MG: 5 TABLET ORAL at 07:58

## 2020-10-31 RX ADMIN — KETOROLAC TROMETHAMINE 15 MG: 15 INJECTION, SOLUTION INTRAMUSCULAR; INTRAVENOUS at 07:53

## 2020-10-31 RX ADMIN — ACETAMINOPHEN 975 MG: 325 TABLET, FILM COATED ORAL at 23:50

## 2020-10-31 RX ADMIN — OXYCODONE HYDROCHLORIDE 5 MG: 5 TABLET ORAL at 11:18

## 2020-10-31 RX ADMIN — ACETAMINOPHEN 975 MG: 325 TABLET, FILM COATED ORAL at 05:48

## 2020-10-31 RX ADMIN — HYDROMORPHONE HYDROCHLORIDE 1 MG: 1 INJECTION, SOLUTION INTRAMUSCULAR; INTRAVENOUS; SUBCUTANEOUS at 23:51

## 2020-10-31 RX ADMIN — OXYCODONE HYDROCHLORIDE 5 MG: 5 TABLET ORAL at 02:48

## 2020-10-31 RX ADMIN — HYDROMORPHONE HYDROCHLORIDE 1 MG: 1 INJECTION, SOLUTION INTRAMUSCULAR; INTRAVENOUS; SUBCUTANEOUS at 09:52

## 2020-10-31 RX ADMIN — ONDANSETRON 4 MG: 4 TABLET, ORALLY DISINTEGRATING ORAL at 23:56

## 2020-10-31 RX ADMIN — HYDROMORPHONE HYDROCHLORIDE 1 MG: 1 INJECTION, SOLUTION INTRAMUSCULAR; INTRAVENOUS; SUBCUTANEOUS at 12:17

## 2020-10-31 RX ADMIN — HYDROMORPHONE HYDROCHLORIDE 1 MG: 1 INJECTION, SOLUTION INTRAMUSCULAR; INTRAVENOUS; SUBCUTANEOUS at 17:59

## 2020-10-31 RX ADMIN — LORAZEPAM 1 MG: 1 TABLET ORAL at 13:14

## 2020-10-31 RX ADMIN — CYCLOBENZAPRINE HYDROCHLORIDE 5 MG: 5 TABLET, FILM COATED ORAL at 08:59

## 2020-10-31 RX ADMIN — SODIUM CHLORIDE 1 G: 900 INJECTION INTRAVENOUS at 11:20

## 2020-10-31 RX ADMIN — SODIUM CHLORIDE: 9 INJECTION, SOLUTION INTRAVENOUS at 17:57

## 2020-10-31 RX ADMIN — ROSUVASTATIN CALCIUM 2.5 MG: 5 TABLET, FILM COATED ORAL at 21:31

## 2020-10-31 RX ADMIN — ONDANSETRON 4 MG: 4 TABLET, ORALLY DISINTEGRATING ORAL at 15:28

## 2020-10-31 NOTE — NURSING NOTE
Pt continues to be in uncotrollable pain, groaning; pain/palliative, micah santana, paged to see when pt could be seen

## 2020-10-31 NOTE — PROGRESS NOTES
Patient: Dulce Seo  Location: [facility] 4B 4210  MRN: [id number]  Today's date: 10/31/2020    Attempted to see patient for therapy. Unable due to patient refused (Pt initally agreeable to therapy, obtained home set-up and PLOF.  However pt's pain increasing as session progressed reporting block was wearing off.  With attempt OOB pt refused due to pain.  RN administered pain meds.  Re-attemted at 9:30 however pt reporting pt's pain remains uncontrolled.  Will continue to follow.

## 2020-10-31 NOTE — PLAN OF CARE
Plan of Care Review  Plan of Care Reviewed With: patient, sibling  Progress: improving  Outcome Summary: pt neuro checks stable; pt states numbness in R foot; wound vac w bloody drainage; medicated for pain; vss; antibiotics, ivfs continue

## 2020-10-31 NOTE — PROGRESS NOTES
Patient: Dulce Seo  Location: [facility] 4B 4210  MRN: [id number]  Today's date: 10/31/2020    Attempted to see patient for therapy. Unable due to severe pain. Attempted 3x today.   .

## 2020-10-31 NOTE — ASSESSMENT & PLAN NOTE
Acute pain, post op day 1    See HPI  for full details of pain description and medication utilization    If pain disproportionate to surgical procedure performed, consider re-eval of site by DPM    Plan  Continue multimodal approach to pain management  Continue  acetaminophen q 8hr   Consider changing Toradol to ATC from prn (but it is being utilized)  Consider adding gabapentin 300mg q 8hr   Considering increasing oxycodone to 10-15mg q 4 hprn pain. (first try 10mg, if dose not effective and pt not sedated, can increase to 15mg q 4h prn  Would limit use of IV dilauded , Dilauded 1mg iv q 6hr prn pain unrelieved by oral regimen   Continue flexeril 5mg po q 8h prn spasm   Ice to behind knee prn  aromatherapy   Monitor for sedation

## 2020-10-31 NOTE — CONSULTS
PALLIATIVE CARE CONSULTATION NOTE      ASSESSMENT AND PLAN  Constipation  Assessment & Plan  Opioid induced    Plan  Consider senna 1 po daily for constipation, (hold for loose stool) while on opioids     Acute pain  Assessment & Plan  Acute pain, post op day 1    See HPI  for full details of pain description and medication utilization    If pain disproportionate to surgical procedure performed, consider re-eval of site by DPM    Plan  Continue multimodal approach to pain management  Continue  acetaminophen q 8hr   Consider changing Toradol to ATC from prn (but it is being utilized)  Consider adding gabapentin 300mg q 8hr   Considering increasing oxycodone to 10-15mg q 4 hprn pain. (first try 10mg, if dose not effective and pt not sedated, can increase to 15mg q 4h prn  Would limit use of IV dilauded , Dilauded 1mg iv q 6hr prn pain unrelieved by oral regimen   Continue flexeril 5mg po q 8h prn spasm   Ice to behind knee prn  aromatherapy   Monitor for sedation     Constipation  Assessment & Plan  Opioid induced    Plan  Consider senna 1 po daily for constipation, (hold for loose stool) while on opioids     Acute pain  Assessment & Plan  Acute pain, post op day 1    See HPI  for full details of pain description and medication utilization    If pain disproportionate to surgical procedure performed, consider re-eval of site by DPM    Plan  Continue multimodal approach to pain management  Continue  acetaminophen q 8hr   Consider changing Toradol to ATC from prn (but it is being utilized)  Consider adding gabapentin 300mg q 8hr   Considering increasing oxycodone to 10-15mg q 4 hprn pain. (first try 10mg, if dose not effective and pt not sedated, can increase to 15mg q 4h prn  Would limit use of IV dilauded , Dilauded 1mg iv q 6hr prn pain unrelieved by oral regimen   Continue flexeril 5mg po q 8h prn spasm   Ice to behind knee prn  aromatherapy   Monitor for sedation         Requesting Physician: Nav  "Todd NEGRO DPM [[id number]]  Reason for Consultation: assistance with Pain management     HPI  Dulce Seo is a 60 y.o. 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  She is POD 1s/p Right total ankle replacement revision.  Her PMH is listed below.  She does have a PMH of RSD that she reports was s/p a surgical procedure.  She reports she has been off opioids since 2006.  She also reports that she does not drink alcohol.     Ms. Seo is reporting right ankle pain 9-10/10 at worst and 5/10 at best.  She describes pain as nagging, constant, knife like, some burning.  She reports that the pain is both sides of her ankle and bottom aspect of her heal.  She describes pain similar to having her tendon stretched.      She has has been placed on multimodal pain management plan with ATC acetaminophen, Toradol 15mg q 6hr prn, also has utilized Dilauded 1mg x3/24,   Oxycodone 5mg x 4/24,  Along with ice to behind her knee and 1mg iv ativan.      Ms. Seo's speech is fast and somewhat pressured, she is moaning and turning in pain, some what difficult to focus when reviewing proposed plan of care.  She express dissatisfaction with her pain management thus far.       Personally reviewed case with Dr. Pittman     [place], [place]  [facility] (Outside records) query reviewed with no concerns.  There are no scripts on summary.      Pt denies current use of alcohol, street drugs, or marijuana.   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 n        SUBJECTIVE:  Past Medical History:  Past Medical History:   Diagnosis Date   • Anemia     past hx   • Ankle sprain    • Arthritis    • Claustrophobia    • " Concussion     September 1 st, had fall and concussion   • Dizziness    • GERD (gastroesophageal reflux disease)     past hx   • Hyperthyroidism    • Infection     right middle finger/ that her tip was amputated because of infection   • Low back strain    • Neck strain    • RSD (reflex sympathetic dystrophy)     left side of body/ edith left arm   • Shoulder injury        Past Surgical History:  Past Surgical History:   Procedure Laterality Date   • COLONOSCOPY     • COSMETIC SURGERY      breast augmentation   • FINGER SURGERY      tip of finger amputated   • JOINT REPLACEMENT Right     ankle (first 2013. scheduled for 10/30/2020)       Allergies:  Bee sting [bee venom protein (honey bee)] and Codeine    Medications:    Current Facility-Administered Medications:   •  acetaminophen (TYLENOL) tablet 975 mg, 975 mg, oral, q8h ROSE, Anamaria Conley DPM, 975 mg at 10/31/20 1314  •  alum-mag hydroxide-simeth (MAALOX) 200-200-20 mg/5 mL suspension 30 mL, 30 mL, oral, q4h PRN, Anamaria Conley DPM  •  amitriptyline (ELAVIL) tablet 25 mg, 25 mg, oral, Nightly, Anamaria Conley DPM, 25 mg at 10/30/20 2201  •  ceFAZolin (ANCEF) IVPB 1 g/50 mL NSS, 1 g, intravenous, q8h INT, Anamaria Conley DPM, Stopped at 10/31/20 1150  •  cyclobenzaprine (FLEXERIL) tablet 5 mg, 5 mg, oral, q8h PRN, Anamaria Conley DPM, 5 mg at 10/31/20 0859  •  glucose chewable tablet 16-32 g of dextrose, 16-32 g of dextrose, oral, PRN **OR** dextrose 40 % oral gel 15-30 g of dextrose, 15-30 g of dextrose, oral, PRN **OR** glucagon (GLUCAGEN) injection 1 mg, 1 mg, intramuscular, PRN **OR** dextrose in water injection 12.5 g, 25 mL, intravenous, PRN, Anamaria Conley DPM  •  diphenhydrAMINE (BENADRYL) capsule 25 mg, 25 mg, oral, q6h PRN **OR** diphenhydrAMINE (BENADRYL) injection 25 mg, 25 mg, intravenous, q6h PRN, Anamaria Conley DPM  •  enoxaparin (LOVENOX) syringe 40 mg, 40  mg, subcutaneous, Daily (6p), Anamaria Conley DPM, 40 mg at 10/30/20 2047  •  gabapentin (NEURONTIN) 250 mg/5 mL solution 300 mg, 300 mg, oral, q8h ROSE, Kesha Pittman DPM  •  HYDROmorphone (DILAUDID) 1 mg/mL injection 1 mg, 1 mg, intravenous, q6h PRN, Kesha Pittman DPM  •  ketorolac (TORADOL) injection 15 mg, 15 mg, intravenous, q6h PRN, Anamaria Conley DPM, 15 mg at 10/31/20 1317  •  levothyroxine (SYNTHROID) tablet 75 mcg, 75 mcg, oral, Daily (6:30a), Anamaria Conley DPM  •  ondansetron ODT (ZOFRAN-ODT) disintegrating tablet 4 mg, 4 mg, oral, q8h PRN, 4 mg at 10/31/20 1528 **OR** ondansetron (ZOFRAN) injection 4 mg, 4 mg, intravenous, q8h PRN, Aanmaria Conley DPM  •  oxyCODONE (ROXICODONE) immediate release tablet 10-15 mg, 10-15 mg, oral, q4h PRN, Kesha Pittman DPM, 15 mg at 10/31/20 1524  •  rosuvastatin (CRESTOR) tablet 2.5 mg, 2.5 mg, oral, Nightly, Anamaria Conley DPM, 2.5 mg at 10/30/20 2201  •  senna (SENOKOT) tablet 1 tablet, 1 tablet, oral, 2x daily PRN, Anamaria Conley DPM  •  sodium chloride 0.9 % infusion, , intravenous, Continuous, Anamaria Conley DPM, Last Rate: 80 mL/hr at 10/31/20 1150    Social History:  Social History     Socioeconomic History   • Marital status:      Spouse name: None   • Number of children: 2   • Years of education: 10   • Highest education level: GED or equivalent   Occupational History   • Occupation:      Employer: OTHER     Comment: [employer]   Social Needs   • Financial resource strain: Not hard at all   • Food insecurity     Worry: Never true     Inability: Never true   • Transportation needs     Medical: No     Non-medical: No   Tobacco Use   • Smoking status: Current Every Day Smoker     Packs/day: 0.50     Years: 20.00     Pack years: 10.00     Types: Cigarettes   • Smokeless tobacco: Never Used   • Tobacco  "comment:  yrs - attempts to quit time to time. stress causes re-use   Substance and Sexual Activity   • Alcohol use: Not Currently     Frequency: Never     Binge frequency: Never     Comment: alchol abuse in past/ sober since    • Drug use: Not Currently     Types: Methamphetamines, Cocaine     Comment: past hx of drug abuse/ sober since    • Sexual activity: Not Currently   Lifestyle   • Physical activity     Days per week: None     Minutes per session: None   • Stress: None   Relationships   • Social connections     Talks on phone: None     Gets together: None     Attends Anabaptism service: None     Active member of club or organization: None     Attends meetings of clubs or organizations: None     Relationship status: None   • Intimate partner violence     Fear of current or ex partner: None     Emotionally abused: None     Physically abused: None     Forced sexual activity: None   Other Topics Concern   • None   Social History Narrative   • None       Family History:  History reviewed. No pertinent family history.    Review of Systems:  All other systems reviewed and negative except as noted in the HPI.    OBJECTIVE:  Vitals:   Visit Vitals  BP (!) 150/75 (BP Location: Right upper arm, Patient Position: Lying)   Pulse 87   Temp 36.4 °C (97.5 °F) (Oral)   Resp 16   Ht 1.6 m (5' 3\")   Wt 56.5 kg (124 lb 9.6 oz)   SpO2 96%   BMI 22.07 kg/m²     TMax (12h): Temp (12hrs), Av.8 °C (98.2 °F), Min:36.4 °C (97.5 °F), Max:37.1 °C (98.8 °F)    Weight from last three encounters:   Wt Readings from Last 3 Encounters:   10/30/20 56.5 kg (124 lb 9.6 oz)   10/23/20 56.7 kg (125 lb)     I/Os:     Intake/Output Summary (Last 24 hours) at 10/31/2020 1542  Last data filed at 10/31/2020 0547  Gross per 24 hour   Intake 2124 ml   Output 1700 ml   Net 424 ml       Physical Exam  Vitals signs and nursing note reviewed.   Constitutional:       Appearance: Normal appearance.   HENT:      Mouth/Throat:      Mouth: " Mucous membranes are moist.   Eyes:      General: No scleral icterus.  Cardiovascular:      Rate and Rhythm: Normal rate and regular rhythm.   Pulmonary:      Effort: Pulmonary effort is normal.      Breath sounds: Normal breath sounds.   Abdominal:      General: There is no distension.   Musculoskeletal:      Comments: Rle, elevated with splint, wrap intact    Neurological:      Mental Status: She is alert and oriented to person, place, and time.   Psychiatric:      Comments: anxious         DATA  Imaging:  X-ray Ankle Right 2 Views    Result Date: 10/30/2020  IMPRESSION: Intraoperative fluoroscopy, as above.    Fl Fluoroscopy Technical Assistance    Result Date: 10/30/2020  IMPRESSION: Intraoperative fluoroscopy, as above.              Labs:  Results from last 7 days   Lab Units 10/31/20  0545   SODIUM mEQ/L 138   POTASSIUM mEQ/L 3.8   CHLORIDE mEQ/L 105   CO2 mEQ/L 25   BUN mg/dL 12   CREATININE mg/dL 0.5*   GLUCOSE mg/dL 101*   CALCIUM mg/dL 8.4*          Results from last 7 days   Lab Units 10/31/20  0545   WBC K/uL 7.44   HEMOGLOBIN g/dL 10.2*   HEMATOCRIT % 31.6*   PLATELETS K/uL 237       Results from last 7 days   Lab Units 10/26/20  1011   INR INR 0.9          Labs independently reviewed. Cr 0.5, anemia 10.2/31/6    Microbiology Data:  Microbiology Results     ** No results found for the last 720 hours. **          Thank you for the opportunity to participate in this patient's hospital plan of care.   HARRY Hackett  [phone]  10/31/2020 3:42 PM

## 2020-10-31 NOTE — PROGRESS NOTES
Subjective:   Pt seen at bedside for    POD#1 s/p R total ankle replacement revision   . Patient reports she is not experiencing pain at this time.    .NAD. No overnight events. Dressing clean, dry, and intact. Denies any N/V/F/C/CP/SOB.     ROS:   Past Medical/Surgical history, Allergies, Meds reviewed in detail as charted  FH/SH reviewed in detail as charted  Review of Systems:  Head and Neck: No complaints  Chest : No complaints  Abdomen: No Complaints  Constitutional: Unremarkable     Vitals: I have reviewed the patient's pertinent vital signs.     Radiology: Reviewed     Labs:   CBC Results       10/26/20                          1011           WBC 4.11           RBC 4.43           HGB 13.6           HCT 42.3           MCV 95.5           MCH 30.7           MCHC 32.2                                       Objective:   Lower Extremity Physical Exam    Splint to the right lower extremity left intact this morning     -Vascular: Capillary refill time is less than 3 seconds to the digits   -Orthopedic: popliteal block active.neg DF/PF intact to the digits right/left foot. No pain on palpation of the posterior right/left calf, no clinical signs of DVT present.   -Neurologic: Light touch and epicritic sensation diminished   -Dermatologic: Splint to the RLEis clean, dry, and intact without strikethrough   Vac suctioning continuously wth 25-50ml of bloody drainage     Assesment/Plan:   60 y.o. patient POD#1 s/p R total ankle replacement revision   - NVS check, popliteal block still in effect   -Abx per Id: currently Ancef q8 till D/C.    -Patient is to be NWB to the RLE with the use of crutches, walker, or wheelchair.   -Pain medication on board as patient needs.   -DVT prophylaxsis Lovenox   -If PT/OT clears her and pain is controlled she will be D/Nik today   - Wound vac can be changed to home vac by nursing. disconnect hose from hospital vac and connect directly to home vac   -Please contact on call podiatry  resident with any questions or concerns     Kesha Pittman DPM-PGY1  Pager #[phone]

## 2020-10-31 NOTE — ASSESSMENT & PLAN NOTE
Opioid induced    Plan  Consider senna 1 po daily for constipation, (hold for loose stool) while on opioids

## 2020-11-01 LAB
ANION GAP SERPL CALC-SCNC: 5 MEQ/L (ref 3–15)
BUN SERPL-MCNC: 10 MG/DL (ref 8–20)
CALCIUM SERPL-MCNC: 8 MG/DL (ref 8.9–10.3)
CHLORIDE SERPL-SCNC: 107 MEQ/L (ref 98–109)
CO2 SERPL-SCNC: 26 MEQ/L (ref 22–32)
CREAT SERPL-MCNC: 0.6 MG/DL (ref 0.6–1.1)
ERYTHROCYTE [DISTWIDTH] IN BLOOD BY AUTOMATED COUNT: 12.7 % (ref 11.7–14.4)
GFR SERPL CREATININE-BSD FRML MDRD: >60 ML/MIN/1.73M*2
GLUCOSE BLD-MCNC: 104 MG/DL (ref 70–99)
GLUCOSE BLD-MCNC: 126 MG/DL (ref 70–99)
GLUCOSE SERPL-MCNC: 109 MG/DL (ref 70–99)
HCT VFR BLDCO AUTO: 32 % (ref 35–45)
HGB BLD-MCNC: 9.9 G/DL (ref 11.8–15.7)
MCH RBC QN AUTO: 30.5 PG (ref 28–33.2)
MCHC RBC AUTO-ENTMCNC: 30.9 G/DL (ref 32.2–35.5)
MCV RBC AUTO: 98.5 FL (ref 83–98)
PDW BLD AUTO: 8.9 FL (ref 9.4–12.3)
PLATELET # BLD AUTO: 211 K/UL (ref 150–369)
POCT TEST: ABNORMAL
POCT TEST: ABNORMAL
POTASSIUM SERPL-SCNC: 3.9 MEQ/L (ref 3.6–5.1)
RBC # BLD AUTO: 3.25 M/UL (ref 3.93–5.22)
SODIUM SERPL-SCNC: 138 MEQ/L (ref 136–144)
WBC # BLD AUTO: 7.32 K/UL (ref 3.8–10.5)

## 2020-11-01 PROCEDURE — 97162 PT EVAL MOD COMPLEX 30 MIN: CPT | Mod: GP

## 2020-11-01 PROCEDURE — 97165 OT EVAL LOW COMPLEX 30 MIN: CPT | Mod: GO

## 2020-11-01 PROCEDURE — 36415 COLL VENOUS BLD VENIPUNCTURE: CPT | Performed by: PODIATRIST

## 2020-11-01 PROCEDURE — 80048 BASIC METABOLIC PNL TOTAL CA: CPT | Performed by: PODIATRIST

## 2020-11-01 PROCEDURE — 25800000 HC PHARMACY IV SOLUTIONS: Performed by: PODIATRIST

## 2020-11-01 PROCEDURE — 63600000 HC DRUGS/DETAIL CODE: Performed by: PODIATRIST

## 2020-11-01 PROCEDURE — 63700000 HC SELF-ADMINISTRABLE DRUG: Performed by: PODIATRIST

## 2020-11-01 PROCEDURE — 12000000 HC ROOM AND CARE MED/SURG

## 2020-11-01 PROCEDURE — 85027 COMPLETE CBC AUTOMATED: CPT | Performed by: PODIATRIST

## 2020-11-01 RX ADMIN — OXYCODONE HYDROCHLORIDE 15 MG: 5 TABLET ORAL at 09:57

## 2020-11-01 RX ADMIN — SODIUM CHLORIDE 1 G: 900 INJECTION INTRAVENOUS at 19:39

## 2020-11-01 RX ADMIN — SODIUM CHLORIDE 1 G: 900 INJECTION INTRAVENOUS at 03:16

## 2020-11-01 RX ADMIN — OXYCODONE HYDROCHLORIDE 15 MG: 5 TABLET ORAL at 13:57

## 2020-11-01 RX ADMIN — ROSUVASTATIN CALCIUM 2.5 MG: 5 TABLET, FILM COATED ORAL at 21:13

## 2020-11-01 RX ADMIN — GABAPENTIN 300 MG: 250 SOLUTION ORAL at 21:39

## 2020-11-01 RX ADMIN — ENOXAPARIN SODIUM 40 MG: 40 INJECTION SUBCUTANEOUS at 17:27

## 2020-11-01 RX ADMIN — CYCLOBENZAPRINE HYDROCHLORIDE 5 MG: 5 TABLET, FILM COATED ORAL at 19:46

## 2020-11-01 RX ADMIN — ACETAMINOPHEN 975 MG: 325 TABLET, FILM COATED ORAL at 13:21

## 2020-11-01 RX ADMIN — ACETAMINOPHEN 975 MG: 325 TABLET, FILM COATED ORAL at 05:26

## 2020-11-01 RX ADMIN — OXYCODONE HYDROCHLORIDE 15 MG: 5 TABLET ORAL at 19:36

## 2020-11-01 RX ADMIN — OXYCODONE HYDROCHLORIDE 15 MG: 5 TABLET ORAL at 03:21

## 2020-11-01 RX ADMIN — OXYCODONE HYDROCHLORIDE 15 MG: 5 TABLET ORAL at 18:29

## 2020-11-01 RX ADMIN — ALUMINUM HYDROXIDE, MAGNESIUM HYDROXIDE, AND SIMETHICONE 30 ML: 200; 200; 20 SUSPENSION ORAL at 17:27

## 2020-11-01 RX ADMIN — KETOROLAC TROMETHAMINE 15 MG: 15 INJECTION, SOLUTION INTRAMUSCULAR; INTRAVENOUS at 21:05

## 2020-11-01 RX ADMIN — HYDROMORPHONE HYDROCHLORIDE 1 MG: 1 INJECTION, SOLUTION INTRAMUSCULAR; INTRAVENOUS; SUBCUTANEOUS at 06:05

## 2020-11-01 RX ADMIN — KETOROLAC TROMETHAMINE 15 MG: 15 INJECTION, SOLUTION INTRAMUSCULAR; INTRAVENOUS at 06:53

## 2020-11-01 RX ADMIN — AMITRIPTYLINE HYDROCHLORIDE 25 MG: 25 TABLET, FILM COATED ORAL at 21:13

## 2020-11-01 RX ADMIN — HYDROMORPHONE HYDROCHLORIDE 1 MG: 1 INJECTION, SOLUTION INTRAMUSCULAR; INTRAVENOUS; SUBCUTANEOUS at 18:42

## 2020-11-01 RX ADMIN — ACETAMINOPHEN 975 MG: 325 TABLET, FILM COATED ORAL at 19:30

## 2020-11-01 RX ADMIN — SODIUM CHLORIDE: 9 INJECTION, SOLUTION INTRAVENOUS at 03:21

## 2020-11-01 RX ADMIN — SODIUM CHLORIDE 1 G: 900 INJECTION INTRAVENOUS at 13:19

## 2020-11-01 RX ADMIN — OXYCODONE HYDROCHLORIDE 15 MG: 5 TABLET ORAL at 23:37

## 2020-11-01 ASSESSMENT — COGNITIVE AND FUNCTIONAL STATUS - GENERAL
HELP NEEDED FOR PERSONAL GROOMING: 4 - NONE
HELP NEEDED FOR BATHING: 3 - A LITTLE
AFFECT: WFL
DRESSING REGULAR UPPER BODY CLOTHING: 4 - NONE
EATING MEALS: 4 - NONE
AFFECT: WFL
STANDING UP FROM CHAIR USING ARMS: 3 - A LITTLE
MOVING TO AND FROM BED TO CHAIR: 3 - A LITTLE
TOILETING: 3 - A LITTLE
EATING MEALS: 4 - NONE
TOILETING: 3 - A LITTLE
MOVING TO AND FROM BED TO CHAIR: 3 - A LITTLE
WALKING IN HOSPITAL ROOM: 3 - A LITTLE
CLIMB 3 TO 5 STEPS WITH RAILING: 2 - A LOT
HELP NEEDED FOR BATHING: 3 - A LITTLE
DRESSING REGULAR UPPER BODY CLOTHING: 4 - NONE
CLIMB 3 TO 5 STEPS WITH RAILING: 2 - A LOT
STANDING UP FROM CHAIR USING ARMS: 3 - A LITTLE
DRESSING REGULAR LOWER BODY CLOTHING: 3 - A LITTLE
WALKING IN HOSPITAL ROOM: 3 - A LITTLE
DRESSING REGULAR LOWER BODY CLOTHING: 3 - A LITTLE
HELP NEEDED FOR PERSONAL GROOMING: 4 - NONE

## 2020-11-01 NOTE — HOSPITAL COURSE
Leyda is a 60 y.o. female admitted on 10/30/2020 with Post-op pain. Principal problem is No Principal Problem: There is no principal problem currently on the Problem List. Please update the Problem List and refresh..    Past Medical History  Leyda has a past medical history of Anemia, Ankle sprain, Arthritis, Claustrophobia, Concussion, Dizziness, GERD (gastroesophageal reflux disease), Hyperthyroidism, Infection, Low back strain, Neck strain, RSD (reflex sympathetic dystrophy), and Shoulder injury.    History of Present Illness  Pt underwent Right Total Ankle Revision, Achilles Tendon Lengthening, Lateral Ankle Stabilization (R) performed 10/30/2020. Previous R total ankle replacement performed in 2013. ELEONORA CONTRERAS

## 2020-11-01 NOTE — PLAN OF CARE
Plan of Care Review  Plan of Care Reviewed With: patient  Progress: improving  Outcome Summary: pt medicated w alternating pain meds for c/o RLE pain during night; wound vac w bloody drainage; receiving ivfs/ iv antibiotics; awaiting morning labs; poss d/c today

## 2020-11-01 NOTE — PLAN OF CARE
Plan of Care Review  Plan of Care Reviewed With: patient  Progress: improving  Outcome Summary: pt verbalized better pain control today however still requiring 15mg of oxy Q4 per MAR, needs wheelchair for d/c, plan for d/c tomorrow when it confirmed that pain is well controlled and wheelchair is available

## 2020-11-01 NOTE — PROGRESS NOTES
Letter of medical necessity for wheelchair  Patient is status post right total ankle replacement.  Physical therapy recommends wheelchair for assistance in ambulation.  Wheelchair necessary due to ambulatory dysfunction and nonweightbearing status to right lower extremity.  Patient must be nonweightbearing to right lower extremity for extended period of time due to nature of procedure and pain.  Wheelchair as needed for ambulation.    Kesha Pittman DPM PGY1  Pager #[phone]

## 2020-11-01 NOTE — PLAN OF CARE
Problem: Adult Inpatient Plan of Care  Goal: Readiness for Transition of Care  Intervention: Mutually Develop Transition Plan  Flowsheets (Taken 11/1/2020 1204)  Anticipated Discharge Disposition: home with home health services  Discharge Coordination/Progress: Call to [facility] and left message for on call nurse Tamiko to see if they service the [facility]  Assistive Device/Animal Currently Used at Home: walker, front-wheeled  Concerns Comments: pt is agreeable to homecare and is requesting w/c.  Pt declined commode and is aware showerchair not covered by ins.  Readmission Within the Last 30 Days: no previous admission in last 30 days  Patient/Family Anticipated Services at Transition: home health care  Patient/Family Anticipates Transition to: home with family  Transportation Anticipated: family or friend will provide  Concerns to be Addressed:  • discharge planning  • care coordination/care conferences   Chart reviewed and spoke with pt by phone due to COVID 19 infection prevention measures.  Pt resides wit hher sister in 1 story home with 2 steps into it.  Pt amb with a RW prior to admission and was I ADLs.  Pt has rolling walker at home.  Pt denies any prior homecare.  Spoke with podiatry resident by phone and made her aware PT recommended w/c and pt does want w/c.  Will need letter of medical necessity for w/c as well as measurements.  Pt already has wound vac in the room for home according to podiatry resident.  PT/OT recommended commode for pt and pt declined.  Pt stated she wants a showerchair and I explained they are not covered by insurance and that she can purchase at a local pharmacy or dept store like [employer] or [employer].  Noted PT/OT recommended homecare and pt is agreeable to whatever agency services her area.  Call to Tamiko at [facility] and await call back to see if they service pt's area.  PLAN: home with homecare and w/c.  Savi MENDOZA  11/1/2020 1:10 PM - Spoke with Tamiko from [facility] and  they do not service the [facility].  ECINed referral to several homecare agencies and await responses.  Savi roach MSW W

## 2020-11-01 NOTE — PROGRESS NOTES
Subjective:   Pt seen at bedside for    POD#2 s/p R total ankle replacement revision   .  Patient was experiencing 10 out of 10 pain yesterday after popliteal block wore off.  Patient was seen at bedside with fellow.  Pain management was consulted for recommendations and pain adjustment.  Patient reports that her pain is better managed today but still significant.    .NAD. No overnight events. Dressing clean, dry, and intact. Denies any N/V/F/C/CP/SOB.     ROS:   Past Medical/Surgical history, Allergies, Meds reviewed in detail as charted  FH/SH reviewed in detail as charted  Review of Systems:  Head and Neck: No complaints  Chest : No complaints  Abdomen: No Complaints  Constitutional: Unremarkable     Vitals: I have reviewed the patient's pertinent vital signs.     Radiology: Reviewed     Labs:   CBC Results       11/01/20 10/31/20 10/26/20                    0523 0545 1011         WBC 7.32 7.44 4.11         RBC 3.25 3.28 4.43         HGB 9.9 10.2 13.6         HCT 32.0 31.6 42.3         MCV 98.5 96.3 95.5         MCH 30.5 31.1 30.7         MCHC 30.9 32.3 32.2          237 313         Comment for HGB at 0545 on 10/31/20: ALL RESULTS HAVE BEEN CHECKED          Objective:   Lower Extremity Physical Exam    Splint to the right lower extremity left intact this morning     -Vascular: Capillary refill time is less than 3 seconds to the digits   -Orthopedic:  DF/PF intact to the digits right/left foot.   palpation of the posterior right/left calf, no clinical signs of DVT present.   -Neurologic: Light touch and epicritic sensation intact  -Dermatologic: Splint to the RLEis clean, dry, and intact without strikethrough   Vac suctioning continuously with no increase in bloody drainage today    Assesment/Plan:   60 y.o. patient POD#2 s/p R total ankle replacement revision   - NVS within normal limits  -Abx per Id: currently Ancef q8 till D/C.    -Patient is to be NWB to the RLE with the use of crutches, walker, or  wheelchair.   -Pain medication per pain management recommendations we will continue to monitor pain  -DVT prophylaxsis Lovenox   -If PT/OT evaluation at discharge  - Wound vac can be changed to home vac by nursing. disconnect hose from hospital vac and connect directly to home vac   -Monitoring pain control today, pain is controlled patient may be discharged.  Appreciate pain management recommendations discharge    Kesha Pittman DPM-PGY1  Pager #[phone]

## 2020-11-01 NOTE — PROGRESS NOTES
Patient: Dulce Seo  Location: [facility] 4B 4210  MRN: [id number]  Today's date: 11/1/2020     Pt cleared for PT by RN. Pt left in bedside chair, reclined, chair alarm on, RLE elevated. Wound vac plugged in however RN notified of it being unplugged/blank screen. Needs in reach, RN notified post-session    Leyda is a 60 y.o. female admitted on 10/30/2020 with Post-op pain. Principal problem is No Principal Problem: There is no principal problem currently on the Problem List. Please update the Problem List and refresh..    Past Medical History  Leyda has a past medical history of Anemia, Ankle sprain, Arthritis, Claustrophobia, Concussion, Dizziness, GERD (gastroesophageal reflux disease), Hyperthyroidism, Infection, Low back strain, Neck strain, RSD (reflex sympathetic dystrophy), and Shoulder injury.    History of Present Illness  Pt underwent Right Total Ankle Revision, Achilles Tendon Lengthening, Lateral Ankle Stabilization (R) performed 10/30/2020. Previous R total ankle replacement performed in 2013. NWB RLE    PT Vitals    Date/Time Pulse SpO2 Pt Activity O2 Therapy BP BP Location BP Method Pt Position [facility]   11/01/20 1006 81 -- -- -- 97/53 Right upper arm Automatic Lying    11/01/20 1009 83 -- -- -- 83/50 Right upper arm Automatic Sitting    11/01/20 1012 81 96 % At rest None (Room air) 110/53 Right upper arm Automatic Sitting KK      PT Pain    Date/Time Pain Type Pref Pain Scale Side Location Rating: Rest Rating: Activity Interventions [facility]   11/01/20 1006 Pain Assessment number (Numeric Rating Pain Scale) Right ankle 6 7 position adjusted    11/01/20 1012 Pain Assessment number (Numeric Rating Pain Scale) Right ankle 6 7 position adjusted KK          Prior Living Environment      Most Recent Value   Living Arrangements  house   Living Environment Comment  Pt reports she moved in with her sister. 2 JESSICA with BHR large enough to fit wlaker on. One story home, large walk in shower. Pt inquiring  about seat. (+) grab bars          Prior Level of Function      Most Recent Value   Dominant Hand  right   Ambulation  independent   Transferring  independent   Toileting  independent   Bathing  independent   Dressing  independent   Eating  independent   Communication  understands/communicates without difficulty   Prior Level of Function Comment  Pt reports being independent with mobility PTA. (+) driving, working full time   Assistive Device/Animal Currently Used at Home  none [owns RW]          PT Evaluation and Treatment - 11/01/20 1006        Time Calculation    Start Time  1006     Stop Time  1021     Time Calculation (min)  15 min        Session Details    Document Type  initial evaluation     Mode of Treatment  physical therapy        General Information    Patient Profile Reviewed?  yes     Onset of Illness/Injury or Date of Surgery  10/30/20     Referring Physician  Nav     General Observations of Patient  Pt received awake in bed, casting to RLE with wound vac. Wound vac not plugged in, plugged in upon arrival     Existing Precautions/Restrictions  fall;weight bearing     Limitations/Impairments  safety/cognitive        Weight-Bearing Status    Right LE Weight-Bearing Status  non weight-bearing (NWB)        Cognition/Psychosocial    Affect/Mental Status (Cognitive)  WFL     Orientation Status (Cognition)  oriented x 4     Follows Commands (Cognition)  follows one step commands;75-90% accuracy;repetition of directions required     Cognitive Function (Cognitive)  safety deficit;attention deficit     Attention Deficit (Cognitive)  concentration;minimal deficit;focused/sustained attention     Safety Deficit (Cognitive)  moderate deficit;impulsivity;insight into deficits/self awareness;problem solving;judgment;safety precautions awareness;awareness of need for assistance     Comment, Cognition  impulsive throughout session, standing prior to initiation of task/low BP        Sensory    Hearing Status  WFL         Vision Assessment/Intervention    Visual Impairment/Limitations  corrective lenses for reading        Sensory Assessment (Somatosensory)    Sensory Assessment (Somatosensory)  bilateral LE;sensation intact     Bilateral LE Sensory Assessment  light touch awareness;intact        Range of Motion (ROM)    Range of Motion  right lower extremity ROM deficit     Right Lower Extremity (ROM)  right LE ROM is WFL except;ankle     Ankle, Right (ROM)  NT secondary to splinting/casting         Strength (Manual Muscle Testing)    Strength (Manual Muscle Testing)  right lower extremity strength deficit;left lower extremity;strength is WFL     Right Lower Extremity Strength  right LE strength is WFL except;ankle     Ankle, Right (Strength)  NT due to post-op        Bed Mobility    [place], Supine to Sit  supervision     Comment (Bed Mobility)  HOB relatively flat, performing long sit, OOB to the L.        Bed to Chair Transfer    [place], Bed to Chair  close supervision     Assistive Device  walker, front-wheeled     Comment  performed to the L, assist for managing wound vac. Able to maintain NWB RLE        Sit to Stand Transfer    [place], Sit to Stand Transfer  close supervision     Assistive Device  walker, front-wheeled     Comment  from EOB, able to maintain NWB RLE however standing prior to RW being provided/vitals obtained        Stand to Sit Transfer    [place], Stand to Sit Transfer  close supervision     Assistive Device  walker, front-wheeled     Comment  to bedside chair, able to maintain NWB RLE. (+) orthostatics when in chair        Gait Training    [place], Gait  close supervision     Assistive Device  walker, front-wheeled     Distance in Feet  2 feet     Gait Pattern Utilized  swing-to     Deviations/Abnormal Patterns (Gait)  gait speed decreased     Maintains Weight-Bearing Status  able to maintain     Comment  able to maintain NWB RLE, hop-to pattern with RW to bedside chair. Pt  impulsive. Steady with no overt LOB. Deferred further ambulation secondary to (+) orthostatics        Stairs Training    Comment  TBD, (+) orthostatics        Safety Issues, Functional Mobility    Safety Issues Affecting Function (Mobility)  ability to follow commands;awareness of need for assistance;impulsivity;insight into deficits/self awareness;problem solving;safety precaution awareness     Impairments Affecting Function (Mobility)  balance;endurance/activity tolerance;pain;range of motion;strength        Balance    Balance Assessment  sitting static balance;sitting dynamic balance;sit to stand dynamic balance;standing static balance;standing dynamic balance     Static Sitting Balance  WFL;unsupported;sitting, edge of bed     Dynamic Sitting Balance  WFL;unsupported;sitting, edge of bed     Sit to Stand Dynamic Balance  WFL;unsupported;standing     Static Standing Balance  WFL;unsupported;standing     Dynamic Standing Balance  mild impairment;supported;standing     Comment, Balance  CS for functional transfers with use of RW, NWB RLE. No overt LOB observed. Limited in assessment due to symptomatic orthostatics        AM-PAC (TM) - Mobility (Current Function)    Turning from your back to your side while in a flat bed without using bedrails?  4 - None     Moving from lying on your back to sitting on the side of a flat bed without using bedrails?  4 - None     Moving to and from a bed to a chair?  3 - A Little     Standing up from a chair using your arms?  3 - A Little     To walk in a hospital room?  3 - A Little     Climbing 3-5 steps with a railing?  2 - A Lot     AM-PAC (TM) Mobility Score  19        Therapy Assessment/Plan (PT)    Rehab Potential (PT)  good, to achieve stated therapy goals     Therapy Frequency (PT)  5 times/wk     Problem List  problems related to;balance;mobility;strength;pain;range of motion (ROM)        Progress Summary (PT)    Daily Outcome Statement (PT)  Pt seen for PT initial  evaluation. Pt stating 6/10 pain at rest in R ankle, 7/10 with mobility. S level bed mobility, CS transfers with RW. Able to maintain NWB RLE however pt is impulsive. Deferred further mobility secondary to symptomatic orthostatics. Anticipate d/c home with assist from sister, home health     Symptoms Noted During/After Treatment  dizziness;increased pain        Therapy Plan Review/Discharge Plan (PT)    PT Recommended Discharge Disposition  home with assist;home with home health     Anticipated Equipment Needs at Discharge (PT Eval)  commode, 3-in-1;wheelchair;wheelchair cushion        Plan of Care Review    Plan of Care Reviewed With  patient                       Education provided this session. See the Patient Education summary report for full details.    PT Goals      Most Recent Value   Bed Mobility Goal 1   Activity/Assistive Device  rolling to left, rolling to right, sit to supine/supine to sit at 11/01/2020 1006   [place]  independent at 11/01/2020 1006   Time Frame  by discharge at 11/01/2020 1006   Progress/Outcome  goal ongoing at 11/01/2020 1006   Transfer Goal 1   Activity/Assistive Device  sit-to-stand/stand-to-sit, bed-to-chair/chair-to-bed, walker, front-wheeled at 11/01/2020 1006   [place]  modified independence at 11/01/2020 1006   Time Frame  by discharge at 11/01/2020 1006   Progress/Outcome  goal ongoing at 11/01/2020 1006   Gait Training Goal 1   Activity/Assistive Device  gait (walking locomotion), assistive device use, maintain weight-bearing status, walker, front-wheeled at 11/01/2020 1006   [place]  modified independence at 11/01/2020 1006   Distance  50 at 11/01/2020 1006   Time Frame  by discharge at 11/01/2020 1006   Progress/Outcome  goal ongoing at 11/01/2020 1006   Stairs Goal 1   Activity/Assistive Device  ascending stairs, descending stairs, walker, front-wheeled, maintain weight-bearing status [1+1 curb step] at 11/01/2020 1006   [place]  supervision required at  11/01/2020 1006   Number of Stairs  1+1 at 11/01/2020 1006   Time Frame  by discharge at 11/01/2020 1006   Progress/Outcome  goal ongoing at 11/01/2020 1006

## 2020-11-01 NOTE — PLAN OF CARE
Problem: Adult Inpatient Plan of Care  Goal: Plan of Care Review  Outcome: Progressing  Flowsheets (Taken 11/1/2020 1037)  Progress: improving  Plan of Care Reviewed With: patient  Outcome Summary: Pt seen for PT initial evaluation. Pt requiring S-CS level bed mobility and transfers, 6-7/10 pain noted in R ankle. (+) orthostatics. Recommend continued skilled PT services during acute stay

## 2020-11-01 NOTE — PROGRESS NOTES
Patient: Dulce Seo  Location: [facility] 4B 4210  MRN: [id number]  Today's date: 11/1/2020    Leyda is a 60 y.o. female admitted on 10/30/2020 with Post-op pain. Principal problem is No Principal Problem: There is no principal problem currently on the Problem List. Please update the Problem List and refresh..    Past Medical History  Leyda has a past medical history of Anemia, Ankle sprain, Arthritis, Claustrophobia, Concussion, Dizziness, GERD (gastroesophageal reflux disease), Hyperthyroidism, Infection, Low back strain, Neck strain, RSD (reflex sympathetic dystrophy), and Shoulder injury.    History of Present Illness  Pt underwent Right Total Ankle Revision, Achilles Tendon Lengthening, Lateral Ankle Stabilization (R) performed 10/30/2020. Previous R total ankle replacement performed in 2013. NWCHAVA RLE    Therapy Pain/Vitals     Row Name 11/01/20 1006 11/01/20 1009 11/01/20 1012       Pain/Comfort/Sleep    Pain Charting Type  Pain Assessment  --  Pain Assessment    Preferred Pain Scale  number (Numeric Rating Pain Scale)  --  number (Numeric Rating Pain Scale)    Pain Body Location - Side  Right  --  Right    Pain Body Location  ankle  --  ankle    Pain Rating (0-10): Rest  6  --  6    Pain Rating (0-10): Activity  7  --  7    Pain Management Interventions  position adjusted  --  --       Vital Signs    Pulse  81  83  81    SpO2  --  --  96 %    Patient Activity  --  --  At rest    Oxygen Therapy  --  --  None (Room air)    BP  (!) 97/53  (!) 83/50  (!) 110/53    BP Location  Right upper arm  Right upper arm  Right upper arm    BP Method  Automatic  Automatic  Automatic    Patient Position  Lying  Sitting  Sitting            Prior Living Environment      Most Recent Value   Living Arrangements  house   Living Environment Comment  Pt reports she moved in with her sister. 2 [address] with BHR large enough to fit wlaker on. One story home, large walk in shower. Pt inquiring about seat. (+) grab bars           Prior Level of Function      Most Recent Value   Dominant Hand  right   Ambulation  independent   Transferring  independent   Toileting  independent   Bathing  independent   Dressing  independent   Eating  independent   Communication  understands/communicates without difficulty   Prior Level of Function Comment  Pt reports being independent with mobility PTA. (+) driving, currently writing a book, prior worked as    Assistive Device/Animal Currently Used at Home  walker, front-wheeled          Occupational Profile      Most Recent Value   Reason for Services/Referral  R ankle sx   Successful Occupations  IND ADLs   Occupational History/Life Experiences  author, prior worked as    Performance Patterns  IND IADLs   Environmental Supports and Barriers  sister supportive   Patient Goals  go home          OT Evaluation and Treatment - 11/01/20 1007        Time Calculation    Start Time  1006     Stop Time  1021     Time Calculation (min)  15 min        Session Details    Document Type  initial evaluation     Mode of Treatment  occupational therapy        General Information    Patient Profile Reviewed?  yes     General Observations of Patient  in bed, wound vac to R LE unplugged     Existing Precautions/Restrictions  fall;weight bearing     Limitations/Impairments  safety/cognitive        Weight-Bearing Status    Right LE Weight-Bearing Status  non weight-bearing (NWB)        Cognition/Psychosocial    Affect/Mental Status (Cognitive)  WFL     Orientation Status (Cognition)  oriented x 4     Follows Commands (Cognition)  WFL     Cognitive Function (Cognitive)  attention deficit;executive function deficit;safety deficit     Attention Deficit (Cognitive)  minimal deficit;focused/sustained attention     Executive Function Deficit (Cognition)  minimal deficit;insight/awareness of deficits     Safety Deficit (Cognitive)  impulsivity;moderate deficit     Comment, Cognition  standing without RW in front  despite low BP         Hearing Assessment    Hearing Status  WFL        Vision Assessment/Intervention    Visual Impairment/Limitations  corrective lenses for reading        Sensory Assessment (Somatosensory)    Sensory Assessment (Somatosensory)  sensation intact;bilateral UE        Range of Motion (ROM)    Range of Motion  ROM is WFL;bilateral upper extremities        Strength (Manual Muscle Testing)    Strength (Manual Muscle Testing)  strength is WFL;bilateral upper extremities        Bed Mobility    [place], Supine to Sit  supervision     Comment (Bed Mobility)  flat to L, impulsive         Transfers    Transfers  toilet transfer     Maintains Weight-Bearing Status (Transfers)  able to maintain weight-bearing status        Bed to Chair Transfer    [place], Bed to Chair  close supervision;verbal cues     Verbal Cues  safety     Assistive Device  walker, front-wheeled     Comment  to L         Sit to Stand Transfer    [place], Sit to Stand Transfer  close supervision;verbal cues     Verbal Cues  safety     Assistive Device  walker, front-wheeled     Comment  from bed, impulsive, standing prior to RW In front         Stand to Sit Transfer    [place], Stand to Sit Transfer  close supervision;verbal cues     Verbal Cues  safety     Assistive Device  walker, front-wheeled     Comment  chair        Toilet Transfer    Comment  declined        Balance    Static Sitting Balance  WFL;supported;sitting in chair     Dynamic Sitting Balance  WFL;unsupported;sitting, edge of bed     Sit to Stand Dynamic Balance  WFL;supported     Static Standing Balance  WFL;supported     Dynamic Standing Balance  mild impairment;supported     Comment, Balance  supported= RW         Lower Body Dressing    Self-Performance  dons/doffs left sock;dons/doffs left shoe     [place]  supervision     Comment  presents dressing in pants, Pt reports apprpriate adaptive technique to complete in bed        Toileting    Comment   declined         AM-PAC (TM) - ADL (Current Function)    Putting on and taking off regular lower body clothing?  3 - A Little     Bathing?  3 - A Little     Toileting?  3 - A Little     Putting on/taking off regular upper body clothing?  4 - None     How much help for taking care of personal grooming?  4 - None     Eating meals?  4 - None     AM-PAC (TM) ADL Score  21        Therapy Assessment/Plan (OT)    Rehab Potential (OT)  good, to achieve stated therapy goals     Therapy Frequency (OT)  3 times/wk        Progress Summary (OT)    Daily Outcome Statement (OT)  AMPAC 21, SUp supine to sit, close SUP sit to stand/stand to sit/bed to chair with RW, impulsive, maintained NWB R LE, SUP LB dressing, Rec continued OT to promote safety and IND with ADLs      Symptoms Noted During/After Treatment  dizziness    drop in BP, see vitals, RN aware       Therapy Plan Review/Discharge Plan (OT)    OT Recommended Discharge Disposition  home with home health;home with assist     Anticipated Equipment Needs At Discharge (OT)  commode, 3-in-1;shower chair                   Education provided this session. See the Patient Education summary report for full details.         OT Goals      Most Recent Value   Transfer Goal 1   Activity/Assistive Device  sit-to-stand/stand-to-sit, bed-to-chair/chair-to-bed, toilet, shower chair, walker, front-wheeled at 11/01/2020 1007   [place]  modified independence at 11/01/2020 1007   Time Frame  by discharge at 11/01/2020 1007   Progress/Outcome  goal ongoing at 11/01/2020 1007   Dressing Goal 1   Activity/Adaptive Equipment  lower body dressing at 11/01/2020 1007   [place]  modified independence at 11/01/2020 1007   Time Frame  by discharge at 11/01/2020 1007   Progress/Outcome  goal ongoing at 11/01/2020 1007   Toileting Goal 1   Activity/Assistive Device  toileting skills, all at 11/01/2020 1007   [place]  modified independence at 11/01/2020 1007   Time Frame  by discharge at  11/01/2020 1007   Progress/Outcome  goal ongoing at 11/01/2020 1007

## 2020-11-01 NOTE — PLAN OF CARE
Problem: Adult Inpatient Plan of Care  Goal: Plan of Care Review  Outcome: Progressing  Flowsheets (Taken 11/1/2020 1214)  Progress: improving  Plan of Care Reviewed With: patient  Outcome Summary: OT eval complete: close SUP OOB to chair, SUP LB dressing

## 2020-11-02 VITALS
WEIGHT: 124.6 LBS | OXYGEN SATURATION: 92 % | RESPIRATION RATE: 18 BRPM | TEMPERATURE: 98.4 F | SYSTOLIC BLOOD PRESSURE: 117 MMHG | DIASTOLIC BLOOD PRESSURE: 58 MMHG | HEIGHT: 63 IN | BODY MASS INDEX: 22.08 KG/M2 | HEART RATE: 96 BPM

## 2020-11-02 LAB
ANION GAP SERPL CALC-SCNC: 6 MEQ/L (ref 3–15)
BUN SERPL-MCNC: 6 MG/DL (ref 8–20)
CALCIUM SERPL-MCNC: 8.3 MG/DL (ref 8.9–10.3)
CHLORIDE SERPL-SCNC: 101 MEQ/L (ref 98–109)
CO2 SERPL-SCNC: 30 MEQ/L (ref 22–32)
CREAT SERPL-MCNC: 0.5 MG/DL (ref 0.6–1.1)
ERYTHROCYTE [DISTWIDTH] IN BLOOD BY AUTOMATED COUNT: 12.3 % (ref 11.7–14.4)
GFR SERPL CREATININE-BSD FRML MDRD: >60 ML/MIN/1.73M*2
GLUCOSE SERPL-MCNC: 91 MG/DL (ref 70–99)
HCT VFR BLDCO AUTO: 34.3 % (ref 35–45)
HGB BLD-MCNC: 10.8 G/DL (ref 11.8–15.7)
MCH RBC QN AUTO: 30.4 PG (ref 28–33.2)
MCHC RBC AUTO-ENTMCNC: 31.5 G/DL (ref 32.2–35.5)
MCV RBC AUTO: 96.6 FL (ref 83–98)
PDW BLD AUTO: 8.9 FL (ref 9.4–12.3)
PLATELET # BLD AUTO: 234 K/UL (ref 150–369)
POTASSIUM SERPL-SCNC: 3.6 MEQ/L (ref 3.6–5.1)
RBC # BLD AUTO: 3.55 M/UL (ref 3.93–5.22)
SODIUM SERPL-SCNC: 137 MEQ/L (ref 136–144)
WBC # BLD AUTO: 5.34 K/UL (ref 3.8–10.5)

## 2020-11-02 PROCEDURE — 63700000 HC SELF-ADMINISTRABLE DRUG: Performed by: PODIATRIST

## 2020-11-02 PROCEDURE — 25800000 HC PHARMACY IV SOLUTIONS: Performed by: PODIATRIST

## 2020-11-02 PROCEDURE — 85027 COMPLETE CBC AUTOMATED: CPT | Performed by: PODIATRIST

## 2020-11-02 PROCEDURE — 80048 BASIC METABOLIC PNL TOTAL CA: CPT | Performed by: PODIATRIST

## 2020-11-02 PROCEDURE — 97530 THERAPEUTIC ACTIVITIES: CPT | Mod: GP

## 2020-11-02 PROCEDURE — 63600000 HC DRUGS/DETAIL CODE: Performed by: PODIATRIST

## 2020-11-02 PROCEDURE — 36415 COLL VENOUS BLD VENIPUNCTURE: CPT | Performed by: PODIATRIST

## 2020-11-02 RX ORDER — ASPIRIN 325 MG
325 TABLET, DELAYED RELEASE (ENTERIC COATED) ORAL DAILY
Qty: 30 TABLET | Refills: 0 | Status: SHIPPED | OUTPATIENT
Start: 2020-11-02 | End: 2020-11-02 | Stop reason: SDUPTHER

## 2020-11-02 RX ORDER — ONDANSETRON 4 MG/1
4 TABLET, ORALLY DISINTEGRATING ORAL EVERY 8 HOURS PRN
Qty: 20 TABLET | Refills: 0 | Status: SHIPPED | OUTPATIENT
Start: 2020-11-02 | End: 2020-11-02 | Stop reason: SDUPTHER

## 2020-11-02 RX ORDER — ONDANSETRON 4 MG/1
4 TABLET, ORALLY DISINTEGRATING ORAL EVERY 8 HOURS PRN
Qty: 20 TABLET | Refills: 0 | Status: SHIPPED | OUTPATIENT
Start: 2020-11-02 | End: 2020-11-09

## 2020-11-02 RX ORDER — CEPHALEXIN 500 MG/1
500 CAPSULE ORAL 2 TIMES DAILY
Qty: 10 CAPSULE | Refills: 0 | Status: SHIPPED | OUTPATIENT
Start: 2020-11-02 | End: 2020-11-02 | Stop reason: SDUPTHER

## 2020-11-02 RX ORDER — OXYCODONE AND ACETAMINOPHEN 5; 325 MG/1; MG/1
2 TABLET ORAL EVERY 4 HOURS PRN
Qty: 30 TABLET | Refills: 0 | Status: SHIPPED | OUTPATIENT
Start: 2020-11-02 | End: 2020-11-07

## 2020-11-02 RX ORDER — ASPIRIN 325 MG
325 TABLET, DELAYED RELEASE (ENTERIC COATED) ORAL DAILY
Qty: 30 TABLET | Refills: 0 | Status: SHIPPED | OUTPATIENT
Start: 2020-11-02 | End: 2020-12-02

## 2020-11-02 RX ORDER — OXYCODONE AND ACETAMINOPHEN 5; 325 MG/1; MG/1
2 TABLET ORAL EVERY 4 HOURS PRN
Qty: 30 TABLET | Refills: 0 | Status: SHIPPED | OUTPATIENT
Start: 2020-11-02 | End: 2020-11-02 | Stop reason: SDUPTHER

## 2020-11-02 RX ORDER — CEPHALEXIN 500 MG/1
500 CAPSULE ORAL 2 TIMES DAILY
Qty: 10 CAPSULE | Refills: 0 | Status: SHIPPED | OUTPATIENT
Start: 2020-11-02 | End: 2020-11-07

## 2020-11-02 RX ADMIN — KETOROLAC TROMETHAMINE 15 MG: 15 INJECTION, SOLUTION INTRAMUSCULAR; INTRAVENOUS at 04:15

## 2020-11-02 RX ADMIN — LEVOTHYROXINE SODIUM 75 MCG: 0.07 TABLET ORAL at 06:41

## 2020-11-02 RX ADMIN — GABAPENTIN 300 MG: 250 SOLUTION ORAL at 06:41

## 2020-11-02 RX ADMIN — ACETAMINOPHEN 975 MG: 325 TABLET, FILM COATED ORAL at 06:40

## 2020-11-02 RX ADMIN — OXYCODONE HYDROCHLORIDE 10 MG: 5 TABLET ORAL at 13:43

## 2020-11-02 RX ADMIN — SODIUM CHLORIDE 1 G: 900 INJECTION INTRAVENOUS at 04:15

## 2020-11-02 RX ADMIN — GABAPENTIN 300 MG: 250 SOLUTION ORAL at 13:44

## 2020-11-02 RX ADMIN — ACETAMINOPHEN 975 MG: 325 TABLET, FILM COATED ORAL at 13:45

## 2020-11-02 RX ADMIN — OXYCODONE HYDROCHLORIDE 15 MG: 5 TABLET ORAL at 04:06

## 2020-11-02 RX ADMIN — SODIUM CHLORIDE 1 G: 900 INJECTION INTRAVENOUS at 11:43

## 2020-11-02 RX ADMIN — ONDANSETRON 4 MG: 4 TABLET, ORALLY DISINTEGRATING ORAL at 11:43

## 2020-11-02 RX ADMIN — OXYCODONE HYDROCHLORIDE 15 MG: 5 TABLET ORAL at 08:19

## 2020-11-02 ASSESSMENT — COGNITIVE AND FUNCTIONAL STATUS - GENERAL
CLIMB 3 TO 5 STEPS WITH RAILING: 2 - A LOT
AROUSAL LEVEL: ALERT
IMPULSE CONTROL: INTACT
APPETITE: NO CHANGE
ORIENTATION: FULLY ORIENTED
WALKING IN HOSPITAL ROOM: 3 - A LITTLE
THOUGHT_PROCESS: WNL
MOVING TO AND FROM BED TO CHAIR: 3 - A LITTLE
ATTENTION: WNL
EYE_CONTACT: WNL
CONCENTRATION: WNL
THOUGHT_CONTENT: APPROPRIATE
SLEEP_WAKE_CYCLE: DECREASED
APPEARANCE: WELL GROOMED
SPEECH: REGULAR
MOOD: EUTHYMIC (NORMAL);HOPEFUL
AFFECT: WFL
PERCEPTUAL FUNCTION: NORMAL
STANDING UP FROM CHAIR USING ARMS: 3 - A LITTLE
AFFECT: FULL RANGE
LIBIDO: NO CHANGE
INSIGHT: INTACT;AWARE OF PHYSICAL LIMITATIONS;AWARE OF IMPACT ON FUNCTIONING
DELUSIONS: NONE OR AGE APPROPRIATE
PSYCHOMOTOR FUNCTIONING: WNL
EST. PREMORBID INTELLIGENCE: AVERAGE

## 2020-11-02 NOTE — DISCHARGE SUMMARY
Inpatient Discharge Summary    BRIEF OVERVIEW  Admitting Provider:  H&P Notes 10/3/2020 to 11/2/2020         Date of Service   Author Author Type Status Note Type File Time    11/01/20 1012  Bhavya Cartwright MD  Signed H&P 11/01/20 1012    10/30/20 1511  Emy Ibarra DPM Resident Signed H&P 10/30/20 1628    10/29/20 1448  Bhavya Cartwright MD  Signed H&P 10/29/20 1448          Attending Provider: Todd Chopra DPM Attending phys phone: [phone]  Primary Care Physician at Discharge: Shirlene Nix [phone]    Admission Date: 10/30/2020     Discharge Date: 11/2/2020    Primary Discharge Diagnosis  S/P Total Ankle Arthroplasty Revision Right    Secondary Discharge Diagnosis  Active Hospital Problems    Diagnosis Date Noted   • Acute pain 10/31/2020   • Constipation 10/31/2020   • Post-op pain 10/30/2020      Resolved Hospital Problems   No resolved problems to display.       DETAILS OF HOSPITAL STAY    Operative Procedures Performed  Procedure(s):  Right Total Ankle Revision, Achilles Tendon Lengthening, Lateral Ankle Stabilization    Consults:   Consult Notes 10/3/2020 to 11/2/2020         Date of Service   Author Author Type Status Note Type File Time    10/31/20 1500  Aida Bah CRNP Nurse Practitioner Signed Consults 10/31/20 1542          Consult Orders During Admission:  IP CONSULT TO CASE MANAGEMENT  IP CONSULT TO SOCIAL WORK  IP CONSULT TO PAIN/PALLIATIVE CARE     Procedures: N/A  Pertinent Test Results: N/A    Imaging  X-ray Ankle Right 2 Views    Result Date: 10/30/2020  IMPRESSION: Intraoperative fluoroscopy, as above.    Fl Fluoroscopy Technical Assistance    Result Date: 10/30/2020  IMPRESSION: Intraoperative fluoroscopy, as above.      Presenting Problem/History of Present Illness  Post-op pain       Exam on Day of Discharge  Patient seen and examined on day of discharge.  Lower Extremity Physical Exam:   Vascular: Dressings remained intact.  No strike through noted B/L.                      Capillary refill time <3seconds B/L.  MSK: +DP/PF of digits intact B/L.               No pain on palpation to the posterior legs B/L. No signs of clinical DVT B/L.   Derm: Dressings C/D/I. No strike through.  Neuro: Light touch and protective sensation is intact.     Hospital Course  You were admitted to the hospital on 10/31/20 for surgery on your right ankle with Dr. Chopra. You were observed on the hospital floors after the procedure for pain control and observation.  While at the hospital, you were evaluated by palliative medicine for assistance with pain control. You were also evaluated by PT/OT for d/c recommendations. You were discharged on 11/2/20 to home with home health. You are to remain NWB to the [facility] with the use of a walker.     Discharge Orders     Medication List      START taking these medications    aspirin 325 mg EC tablet  Take 1 tablet (325 mg total) by mouth daily.  Dose: 325 mg     cephalexin 500 mg capsule  Commonly known as: KEFLEX  Take 1 capsule (500 mg total) by mouth 2 (two) times a day for 5 days.  Dose: 500 mg     ondansetron ODT 4 mg disintegrating tablet  Commonly known as: ZOFRAN ODT  Take 1 tablet (4 mg total) by mouth every 8 (eight) hours as needed for nausea or vomiting for up to 7 days.  Dose: 4 mg     oxyCODONE-acetaminophen 5-325 mg per tablet  Commonly known as: PERCOCET  Take 2 tablets by mouth every 4 (four) hours as needed for moderate pain for up to 5 days.  Dose: 2 tablet        CONTINUE taking these medications    amitriptyline 25 mg tablet  Commonly known as: ELAVIL  Take 25 mg by mouth nightly.  Dose: 25 mg     cyclobenzaprine 5 mg tablet  Commonly known as: FLEXERIL  Take 5 mg by mouth as needed for muscle spasms.  Dose: 5 mg     levothyroxine 75 mcg tablet  Commonly known as: SYNTHROID  Take 75 mcg by mouth daily.  Dose: 75 mcg     rosuvastatin 5 mg tablet  Commonly known as: CRESTOR  Take 2.5 mg by mouth nightly.  Dose: 2.5 mg              Outpatient  Follow-Ups  You will see Dr. Chopra for Incision VAC removal in 7-10 days.  All Pain medications have been handled through Pain Management.      Referrals:  Post-Operative Pain Management will be required due to your history of chronic pain problems.    Active Issues Requiring Follow-up  Issue: Post Op follow up  What is Needed: Follow up with Dr. Chopra in the office following d/c from hospital       Test Results Pending at Discharge  Unresulted Labs (From admission, onward)     Start     Ordered    10/31/20 0600  CBC  Daily     Start Status   11/03/20 0600 Scheduled   11/04/20 0600 Scheduled   11/05/20 0600 Scheduled   11/06/20 0600 Scheduled   11/07/20 0600 Scheduled   11/08/20 0600 Scheduled   11/09/20 0600 Scheduled       10/30/20 1737    10/31/20 0600  Basic metabolic panel  Daily     Start Status   11/03/20 0600 Scheduled   11/04/20 0600 Scheduled   11/05/20 0600 Scheduled   11/06/20 0600 Scheduled   11/07/20 0600 Scheduled   11/08/20 0600 Scheduled   11/09/20 0600 Scheduled       10/30/20 1737                Discharge Disposition  Home   Code Status at Discharge: Full Code  Physician Order for Life-Sustaining Treatment Document Status      No documents found

## 2020-11-02 NOTE — PROGRESS NOTES
[facility] met with pt today and spoke w podiatry. They are planning to have pt leave today with prevena wound vac. Pt no longer wants a wheelchair. Pt is for dc to home today. pts doctor followed up with her about her medications, as she had some questions and concerns. Pt is set up with [facility] and they will do SOC for 11/3-11/4. Pee with podiatry is aware and agreeable. He said prevena vac didn't need to be tended to by agency and it wont come off until the doctor takes it off. Medicare letter discussed and explained. Pt gave verbal consent. [facility] following to assist, as needed.    [facility]

## 2020-11-02 NOTE — PATIENT CARE CONFERENCE
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 Please update the Problem List and refresh.  Principal Problem: No Principal Problem: There is no principal problem currently on the Problem List. Please update the Problem List and refresh.    GMLOS: 3.4  Anticipated Discharge Date: 10/30/2020    AM-PAC  Mobility Score: 19    Discharge Planning:  Living Arrangements: house  Anticipated Discharge Disposition: home with home health services    Barriers to Discharge:  Barriers to Discharge: Medical issues not resolved  Comment: wound vac, ankle revision.     Participants:  social work/services, nursing

## 2020-11-02 NOTE — PLAN OF CARE
Problem: Adult Inpatient Plan of Care  Goal: Plan of Care Review  Outcome: Progressing  Flowsheets (Taken 11/2/2020 2923)  Progress: improving  Plan of Care Reviewed With: patient  Outcome Summary: Sup w/RW, able to maintain NWB status effectively. declines WC for home. declines trial of stairs. eager to go home. no further skilled PT indicated at this level of care

## 2020-11-02 NOTE — PROGRESS NOTES
Subjective:   Patient seen at bedside for POD#3 s/p R total ankle replacement revision   .  Patient was experiencing 10 out of 10 pain yesterday after popliteal block wore off.  Pain management was consulted for recommendations and pain adjustment, patient releates her pain is much better controlled since yesterday. NAD. No overnight events. Dressing clean, dry, and intact. Denies any N/V/F/C/CP/SOB.     Past Medical/Surgical/Family/Social history reviewed in detail as charted  Allergies and Medications reviewed in detail as charted        Review of Systems:  Head and Neck: No complaints  Chest: No complaints  Abdomen: No Complaints  Constitutional: Unremarkable       Objective:      Vitals:  Vitals:    11/02/20 0500   BP: (!) 115/57   Pulse: 88   Resp: 16   Temp: 36.7 °C (98 °F)   SpO2: 93%       Radiology: Reviewed    Labs:     CBC Results       11/02/20 11/01/20 10/31/20                    0544 0523 0545         WBC 5.34 7.32 7.44         RBC 3.55 3.25 3.28         HGB 10.8 9.9 10.2         HCT 34.3 32.0 31.6         MCV 96.6 98.5 96.3         MCH 30.4 30.5 31.1         MCHC 31.5 30.9 32.3          211 237         Comment for HGB at 0545 on 10/31/20: ALL RESULTS HAVE BEEN CHECKED         BMP Results       11/02/20 11/01/20 10/31/20                    0544 0523 0545          138 138         K 3.6 3.9 3.8         Cl 101 107 105         CO2 30 26 25         Glucose 91 109 101         BUN 6 10 12         Creatinine 0.5 0.6 0.5         Calcium 8.3 8.0 8.4         Anion Gap 6 5 8         EGFR >60.0 >60.0 >60.0                           Lower Extremity Physical Exam:   Vascular: Dressings remained intact.  No strike through noted B/L.          Capillary refill time <3seconds B/L.  MSK: +DP/PF of digits intact B/L.    No pain on palpation to the posterior legs B/L. No signs of clinical DVT B/L.   Derm: Dressings C/D/I. No strike through.  Neuro: Light touch and protective sensation is intact.            Assesment/Plan:   60 y.o. patient POD#3 s/p R total ankle replacement revision   - NVS within normal limits  -Abx per Id: currently Ancef q8 till D/C.    -Patient is to be NWB to the RLE with the use of crutches, walker, or wheelchair.   -Pain medication per pain management recommendations we will continue to monitor pain   -DVT prophylaxsis Lovenox   - Wound vac can be changed to home vac by nursing. disconnect hose from hospital vac and connect directly to home vac   -pain controlled today, patient may be discharged pending acquisition of wheel chair from distributor.    -Appreciate pain management recommendations for discharge  -Spoke with case management about acquiring wheelchair from distributor, seems likely that it was take another day to acquire the wheelchair. Possible D/c tomorrow.      Pee Herring, DPM  [phone]

## 2020-11-02 NOTE — PLAN OF CARE
Problem: Adult Inpatient Plan of Care  Goal: Plan of Care Review  Outcome: Progressing  Flowsheets (Taken 11/2/2020 0135)  Progress: improving  Plan of Care Reviewed With: patient  Outcome Summary: Pain controlled woth oral pain meds   Plan of Care Review  Plan of Care Reviewed With: patient  Progress: improving  Outcome Summary: Pain controlled woth oral pain meds

## 2020-11-02 NOTE — PROGRESS NOTES
Spoke with patient regarding acquisition of wheelchair. She now states that she does not want the wheelchair and that she will not use it. She stated that she would only use the walker in her home and that would be enough for her. I reached out to PT and they said they will go evaluate her again today. If PT is ok with walker, I will proceed with D/c today.

## 2020-11-02 NOTE — DISCHARGE INSTRUCTIONS
[facility] will be out to see you on 11/3 or 11/4. They can be contacted at [phone].    Foot and Ankle Post Op Instructions:  Activity:   o No strenuous exercise or heavy lifting (over 10 pounds) until your follow up with doctor   o No driving until following up with doctor    Use a walker.   Weight bearing: NO weight bearing to right leg  o Keep your affected foot elevated on two pillows while in bed and sitting in a chair to decrease swelling   Nutrition:   o Eating more protein will help with wound healing, If possible, add protein to your diet to help increase healing factors.   Wound Care Instructions: leave splint clean, dry, and intact   o No soaking your foot until wound is completely healed.   o It is important that you keep your dressing clean and dry. Should your dressing become wet you must call your physician to make an immediate appointment to have the dressing changed. If you are taking a shower, you must use a commercial cast protector. We recommend, certainly, for the initial two week period that you take a sponge bath rather than a shower with its risk of a fall.   Medications:   o Resume all home medications unless otherwise directed by doctor or nurse practitioner   o You will be also given a prescription for pain called Percocet tablets. Make sure to take this medication as directed. You may take every Percocet every 4 hours for pain with food. You may have nausea or constipation with this medication. Sometimes taking an over the counter stool softener such as Sennocot may help. You were also give Zofran to take as needed for nausea every 8 hours.  Start taking aspirin 325mg which you need to take once a day for 30 days. Take the antibiotic keflex for 5 days two times a day   Reasons to Call:   o Severe and persistent shortness of breath not relieved with rest   o Fever above 101.5 oF   o Redness, swelling or drainage from incision   Follow Up Appointment:   o  Call to schedule an appointment with your physician/surgeon after discharge   o Dr. Todd Chopra   [address] [address] [phone]

## 2020-11-02 NOTE — NURSING NOTE
Oxycodone 15 mg po was pulled and scanned for patient @ 1829 she than refused it and insisted on dilaudid. Dilaudid 1 mg was given @ 1842. When I rounded patient was tito in severe pain. I flushed IV and it immediately leaked . Connectors were not tight. Patient states her arm was wet after receiving dilaudid and flush. I am not sure if she received the any of the dilaudid dose. Patient's severe pain did seem to be genuine.  I gave Leyda Seo the extra strength  tylenol early on her assistance and then gave her the 15 mg PO of oxycodone and 5 mg po .

## 2020-11-02 NOTE — NURSING NOTE
Discharge instructions reviewed. Wound vac changed to home vac. IV removed. Discharged to home via wheelchair.

## 2020-11-02 NOTE — ANESTHESIA POSTPROCEDURE EVALUATION
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, initial encounter (CMS/[facility])      (Right Ankle Osteoarthritis M19.071)      (Mechanical Loosening Prosthetic T84.038a)    Surgeon: Todd Chopra DPM Responsible Provider: Lena Camarillo DO    Anesthesia Type: general ASA Status: 2          Anesthesia Type: general  PACU Vitals  10/30/2020 1449 - 10/30/2020 1549      10/30/2020  1507 10/30/2020  1515 10/30/2020  1530 10/30/2020  1545    BP:  (!) 103/54  (!) 100/53  (!) 105/51  101/61    Temp:  37.2 °C (99 °F)  --  --  --    Pulse:  88  88  94  92    Resp:  15  (!) 25  15  15    SpO2:  99 %  100 %  99 %  98 %            Anesthesia Post Evaluation    Pain management: adequate  Patient location during evaluation: PACU  Patient participation: complete - patient participated  Level of consciousness: awake and alert  Cardiovascular status: acceptable  Airway Patency: adequate  Respiratory status: acceptable  Hydration status: acceptable  Anesthetic complications: no

## 2020-11-02 NOTE — PROGRESS NOTES
Patient: Dulce Seo  Location: [facility] 4B 4210  MRN: [id number]  Today's date: 11/2/2020  Pt left in bed as found, RLE elevated on pillows, call bell in reach, alarm on. RN Notified. Discussed w/podiatry pt declining WC, steady w/RW.  Leyda is a 60 y.o. female admitted on 10/30/2020 with Post-op pain. Principal problem is No Principal Problem: There is no principal problem currently on the Problem List. Please update the Problem List and refresh..    Past Medical History  Leyda has a past medical history of Anemia, Ankle sprain, Arthritis, Claustrophobia, Concussion, Dizziness, GERD (gastroesophageal reflux disease), Hyperthyroidism, Infection, Low back strain, Neck strain, RSD (reflex sympathetic dystrophy), and Shoulder injury.    History of Present Illness  Pt underwent Right Total Ankle Revision, Achilles Tendon Lengthening, Lateral Ankle Stabilization (R) performed 10/30/2020. Previous R total ankle replacement performed in 2013. NWB RLE    PT Vitals    Date/Time Pulse BP BP Location BP Method Pt Position [facility]   11/02/20 1216 94 102/75 Right upper arm Automatic Lying    11/02/20 1220 89 146/63 Right upper arm Automatic Sitting    11/02/20 1230 89 144/63 Right upper arm Automatic Lying LG      PT Pain    Date/Time Pain Type Pref Pain Scale Side Location Rating: Rest Rating: Activity Qual Interventions [facility]   11/02/20 1216 Pain Assessment number (Numeric Rating Pain Scale) Right ankle 5 5 throbbing position adjusted LG   11/02/20 1230 Pain Assessment;Post Activity -- Right ankle 5 5 throbbing position adjusted LG          Prior Living Environment      Most Recent Value   Living Arrangements  house   Living Environment Comment  Pt reports she moved in with her sister. 2 JESSICA with BHR large enough to fit wlaker on. One story home, large walk in shower. Pt inquiring about seat. (+) grab bars          Prior Level of Function      Most Recent Value   Dominant Hand  right   Ambulation  independent    Transferring  independent   Toileting  independent   Bathing  independent   Dressing  independent   Eating  independent   Communication  understands/communicates without difficulty   Prior Level of Function Comment  Pt reports being independent with mobility PTA. (+) driving, working full time   Assistive Device/Animal Currently Used at Home  walker, front-wheeled          PT Evaluation and Treatment - 11/02/20 1216        Time Calculation    Start Time  1216     Stop Time  1231     Time Calculation (min)  15 min        Session Details    Document Type  daily treatment/progress note     Mode of Treatment  physical therapy        General Information    Patient Profile Reviewed?  yes     Onset of Illness/Injury or Date of Surgery  10/30/20     Referring Physician  rich     General Observations of Patient  resting in bed, eager to get up and mobilze. wants to go home, refusing use of WC.     Existing Precautions/Restrictions  fall;weight bearing        Weight-Bearing Status    Right LE Weight-Bearing Status  non weight-bearing (NWB)        Cognition/Psychosocial    Affect/Mental Status (Cognitive)  WFL     Orientation Status (Cognition)  oriented x 4     Follows Commands (Cognition)  WFL     Safety Deficit (Cognitive)  impulsivity        Range of Motion (ROM)    Range of Motion  ROM is WFL;bilateral lower extremities    R ankle NT due to splint       Strength (Manual Muscle Testing)    Strength (Manual Muscle Testing)  strength is WFL;bilateral lower extremities    R ankle NT due to splint       Bed Mobility    [place], Roll Left  independent     [place], Roll Right  independent     [place], Supine to Sit  independent     [place], Sit to Supine  independent     Comment (Bed Mobility)  flat bed, exit to L, impulsive at times        Transfers    Transfers  toilet transfer     Maintains Weight-Bearing Status (Transfers)  able to maintain weight-bearing status        Sit to Stand Transfer     "[place], Sit to Stand Transfer  supervision     Verbal Cues  safety     Assistive Device  walker, front-wheeled     Comment  assist for management of wound vac/tubing        Stand to Sit Transfer    [place], Stand to Sit Transfer  supervision     Verbal Cues  safety     Assistive Device  walker, front-wheeled     Comment  assist for management of wound vac/tubing        Toilet Transfer    Transfer Technique  sit-stand;stand-sit     [place], Toilet Transfer  supervision     Assistive Device  grab bars/safety frame     Comment  assist for management of wound vac/tubing        Gait Training    [place], Gait  supervision     Assistive Device  walker, front-wheeled     Distance in Feet  42 feet    42'x1,12'x2    Gait Pattern Utilized  swing-to     Deviations/Abnormal Patterns (Gait)  step length decreased     Maintains Weight-Bearing Status  able to maintain     Comment  assist for management of wound vac/tubing, occ cues to move in slower/more controlled manner        Stairs Training    Comment  pt declined stair trial, denied concerns w/2 JESSICA home, reports \"having done it before and I will have plenty of help\"        Wheelchair Mobility/Management    Comment, Functional Mobility  pt declines use of WC at this time        Safety Issues, Functional Mobility    Safety Issues Affecting Function (Mobility)  impulsivity     Impairments Affecting Function (Mobility)  balance        Balance    Balance Assessment  sitting static balance;sitting dynamic balance;sit to stand dynamic balance;standing static balance;standing dynamic balance     Static Sitting Balance  WNL;unsupported;sitting, edge of bed     Dynamic Sitting Balance  WNL;unsupported;sitting, edge of bed     Sit to Stand Dynamic Balance  WFL;supported     Static Standing Balance  WFL;supported;mild impairment;unsupported     Dynamic Standing Balance  mild impairment;supported        AM-PAC (TM) - Mobility (Current Function)    Turning from your " back to your side while in a flat bed without using bedrails?  4 - None     Moving from lying on your back to sitting on the side of a flat bed without using bedrails?  4 - None     Moving to and from a bed to a chair?  3 - A Little     Standing up from a chair using your arms?  3 - A Little     To walk in a hospital room?  3 - A Little     Climbing 3-5 steps with a railing?  2 - A Lot     AM-PAC (TM) Mobility Score  19        Progress Summary (PT)    Daily Outcome Statement (PT)  pt w/improved mobility and activity tolerance today. Sup for amb w/RW, asisst for management of wound vac/tubing. occ cues to move in slower/more controlled manner. pt refusing WC at this time. eager to go home. pt denied additional mobility concerns. will DC PT at this time. [facility] 19     Symptoms Noted During/After Treatment  --    c/o nausea t/o sesison       Therapy Plan Review/Discharge Plan (PT)    PT Recommended Discharge Disposition  home with home health;home with assist     Anticipated Equipment Needs at Discharge (PT [place]ente)  commode, 3-in-1                       Education provided this session. See the Patient Education summary report for full details.    PT Goals      Most Recent Value   Bed Mobility Goal 1   Activity/Assistive Device  rolling to left, rolling to right, sit to supine/supine to sit at 11/01/2020 1006   [place]  independent at 11/01/2020 1006   Time Frame  by discharge at 11/01/2020 1006   Progress/Outcome  goal met at 11/02/2020 1216   Transfer Goal 1   Activity/Assistive Device  sit-to-stand/stand-to-sit, bed-to-chair/chair-to-bed, walker, front-wheeled at 11/01/2020 1006   [place]  modified independence at 11/01/2020 1006   Time Frame  by discharge at 11/01/2020 1006   Strategies/Barriers  Supervision due to impulsivity at 11/02/2020 1216   Progress/Outcome  goal partially met at 11/02/2020 1216   Gait Training Goal 1   Activity/Assistive Device  gait (walking locomotion), assistive device use,  maintain weight-bearing status, walker, front-wheeled at 11/01/2020 1006   [place]  modified independence at 11/01/2020 1006   Distance  50 at 11/01/2020 1006   Time Frame  by discharge at 11/01/2020 1006   Progress/Outcome  goal partially met at 11/02/2020 1216   Stairs Goal 1   Activity/Assistive Device  ascending stairs, descending stairs, walker, front-wheeled, maintain weight-bearing status [1+1 curb step] at 11/01/2020 1006   [place]  supervision required at 11/01/2020 1006   Number of Stairs  1+1 at 11/01/2020 1006   Time Frame  by discharge at 11/01/2020 1006   Strategies/Barriers  pt declined trial of stairs, denies concerns at 11/02/2020 1216   Progress/Outcome  goal no longer appropriate at 11/02/2020 1216

## 2020-11-09 ENCOUNTER — DOCUMENTATION (OUTPATIENT)
Dept: PHYSICAL THERAPY | Facility: REHABILITATION | Age: 60
End: 2020-11-09

## 2020-11-09 NOTE — PROGRESS NOTES
PT DISCHARGE NOTE FOR OUTPATIENT THERAPY    Patient: Dulce Seo   MRN: [id number]  : 1960 60 y.o.  Referring Physician: No ref. provider found  Date of Visit: 2020     Pt was discharged due to being admitted to the hospital for a surgery       Discharge information for CARF:  CARF documentation:    Discharge reason:   Hospitalized    Increased independence:   Other: DC due to surgery      Hospitalization during treatment:     Yes    Interrupted for medical reason:    Yes    Skin integrity:       Intact

## 2020-12-14 ENCOUNTER — TRANSCRIBE ORDERS (OUTPATIENT)
Dept: SCHEDULING | Facility: REHABILITATION | Age: 60
End: 2020-12-14

## 2020-12-14 DIAGNOSIS — S06.0X0S CONCUSSION WITHOUT LOSS OF CONSCIOUSNESS, SEQUELA (CMS/HCC): Primary | ICD-10-CM

## 2022-11-28 ENCOUNTER — NEW PATIENT (OUTPATIENT)
Dept: URBAN - METROPOLITAN AREA CLINIC 44 | Facility: CLINIC | Age: 62
End: 2022-11-28

## 2022-11-28 DIAGNOSIS — H43.811: ICD-10-CM

## 2022-11-28 PROCEDURE — 92134 CPTRZ OPH DX IMG PST SGM RTA: CPT

## 2022-11-28 PROCEDURE — 92004 COMPRE OPH EXAM NEW PT 1/>: CPT

## 2022-11-28 PROCEDURE — 92202 OPSCPY EXTND ON/MAC DRAW: CPT

## 2022-11-28 ASSESSMENT — TONOMETRY
OS_IOP_MMHG: 16
OD_IOP_MMHG: 14

## 2022-11-28 ASSESSMENT — VISUAL ACUITY
OD_SC: 20/100
OS_PH: 20/20-1
OS_SC: 20/30-2
OD_PH: 20/50+1

## 2025-03-24 ENCOUNTER — HOSPITAL ENCOUNTER (OUTPATIENT)
Dept: HOSPITAL 99 - CLAB | Age: 65
End: 2025-03-24
Payer: SELF-PAY

## 2025-03-24 DIAGNOSIS — T85.43XA: Primary | ICD-10-CM

## 2025-03-24 PROCEDURE — 88305 TISSUE EXAM BY PATHOLOGIST: CPT

## 2025-08-20 ENCOUNTER — TELEPHONE (OUTPATIENT)
Dept: OTHER | Facility: OTHER | Age: 65
End: 2025-08-20

## (undated) DEVICE — BANDAGE KERLIX STERILE 4.5INX 4.1YDS

## (undated) DEVICE — MANIFOLD FOUR PORT NEPTUNE

## (undated) DEVICE — SUTURE VICRYL 3-0 J416H SH UNDYED

## (undated) DEVICE — SOLN IRRIG STER WATER 1000ML

## (undated) DEVICE — PACK UNIVERSAL TOTAL JOINT

## (undated) DEVICE — PACK RFID KNEE ADD ON

## (undated) DEVICE — GLOVE PROTEXIS PI ORTHO 8.5

## (undated) DEVICE — SET HANDPIECE INTERPULSE

## (undated) DEVICE — STAPLER SKIN 35W PROXIMATE PLUS

## (undated) DEVICE — DRAPE C-ARMOR

## (undated) DEVICE — BLADE 9 X .38 X 18.5MM PERCISION THIN

## (undated) DEVICE — SWABSTICK BETADINE

## (undated) DEVICE — PREVENA INCISION MANAGEMENT SYSTEM

## (undated) DEVICE — HOOD FLYTE SURGICOOL

## (undated) DEVICE — Device

## (undated) DEVICE — BLADE SAW NARROW

## (undated) DEVICE — GOWN SURG X-LARGE MICROCOOL

## (undated) DEVICE — PEG DRILL

## (undated) DEVICE — DRESSING SPONGE ALL GAUZE 4X4 STER 10PK

## (undated) DEVICE — ***USE 132714***BANDAGE ELASTIC 6IN MEDICHOICE

## (undated) DEVICE — SUTURE MONOCRYL 4-0 Y426H PS-2 27IN

## (undated) DEVICE — BLADE WIDE SAW 7"

## (undated) DEVICE — SOLN IRRIG .9%SOD 1000ML

## (undated) DEVICE — CLOTH PREPPING SAGE 2% CHG 2/PK

## (undated) DEVICE — SPLINT PRECUT 4INX 30IN

## (undated) DEVICE — ***USE 138707*** SUTURE PROLENE 4-0 8683G

## (undated) DEVICE — BANDAGE ELASTIC 4IN MEDC

## (undated) DEVICE — CUFF TOURNIQUET DISP 34 X 4

## (undated) DEVICE — DRAPE C-ARM X-RAY EQUIPMENT IMAGE

## (undated) DEVICE — DRESSING PREVENA PLUS 20CM PEEL

## (undated) DEVICE — PIN INBONE STEINMANN 2.4MM

## (undated) DEVICE — SUTURE VICRYL 2-0 J417H SH UNDYED

## (undated) DEVICE — DRAPE LARGE REVERSE FOLD

## (undated) DEVICE — GLOVE SZ 8.5 LINER PROTEXIS PI BL

## (undated) DEVICE — GAUZE XEROFORM 1X8 NON-STERILE PACKET

## (undated) DEVICE — CANISTER INFOVAC 500ML W/GEL- 10 PACK

## (undated) DEVICE — MANIFOLD SINGLE PORT NEPTUNE

## (undated) DEVICE — PENEVAC1 NONSTICK SMOKE EVAC

## (undated) DEVICE — GOWN ISOLATION XXL

## (undated) DEVICE — APPLICATOR CHLORAPREP 26ML ORANGE TINT

## (undated) DEVICE — VEST STERILE

## (undated) DEVICE — ***USE 57698*** SLEEVE FLOWTRON DVT CALF SINGLE USE